# Patient Record
Sex: MALE | Race: WHITE | Employment: UNEMPLOYED | URBAN - METROPOLITAN AREA
[De-identification: names, ages, dates, MRNs, and addresses within clinical notes are randomized per-mention and may not be internally consistent; named-entity substitution may affect disease eponyms.]

---

## 2020-08-24 RX ORDER — CLINDAMYCIN HYDROCHLORIDE 150 MG/1
CAPSULE ORAL
COMMUNITY
Start: 2020-05-29 | End: 2020-08-26 | Stop reason: ALTCHOICE

## 2020-08-24 RX ORDER — BUPRENORPHINE AND NALOXONE 8; 2 MG/1; MG/1
FILM, SOLUBLE BUCCAL; SUBLINGUAL
COMMUNITY
Start: 2020-08-05

## 2020-08-26 ENCOUNTER — OFFICE VISIT (OUTPATIENT)
Dept: FAMILY MEDICINE CLINIC | Age: 60
End: 2020-08-26
Payer: COMMERCIAL

## 2020-08-26 VITALS
HEIGHT: 66 IN | TEMPERATURE: 97.9 F | DIASTOLIC BLOOD PRESSURE: 64 MMHG | RESPIRATION RATE: 20 BRPM | SYSTOLIC BLOOD PRESSURE: 100 MMHG | HEART RATE: 76 BPM | WEIGHT: 120.1 LBS | BODY MASS INDEX: 19.3 KG/M2 | OXYGEN SATURATION: 94 %

## 2020-08-26 LAB
BASOPHILS ABSOLUTE: 0.1 K/UL (ref 0–0.2)
BASOPHILS RELATIVE PERCENT: 0.8 %
EOSINOPHILS ABSOLUTE: 0.1 K/UL (ref 0–0.6)
EOSINOPHILS RELATIVE PERCENT: 1.4 %
HCT VFR BLD CALC: 45.8 % (ref 40.5–52.5)
HEMOGLOBIN: 15 G/DL (ref 13.5–17.5)
LYMPHOCYTES ABSOLUTE: 1.1 K/UL (ref 1–5.1)
LYMPHOCYTES RELATIVE PERCENT: 17.8 %
MCH RBC QN AUTO: 30.4 PG (ref 26–34)
MCHC RBC AUTO-ENTMCNC: 32.6 G/DL (ref 31–36)
MCV RBC AUTO: 93 FL (ref 80–100)
MONOCYTES ABSOLUTE: 0.5 K/UL (ref 0–1.3)
MONOCYTES RELATIVE PERCENT: 8.5 %
NEUTROPHILS ABSOLUTE: 4.4 K/UL (ref 1.7–7.7)
NEUTROPHILS RELATIVE PERCENT: 71.5 %
PDW BLD-RTO: 13.8 % (ref 12.4–15.4)
PLATELET # BLD: 242 K/UL (ref 135–450)
PMV BLD AUTO: 7.9 FL (ref 5–10.5)
RBC # BLD: 4.93 M/UL (ref 4.2–5.9)
WBC # BLD: 6.1 K/UL (ref 4–11)

## 2020-08-26 PROCEDURE — G8427 DOCREV CUR MEDS BY ELIG CLIN: HCPCS | Performed by: NURSE PRACTITIONER

## 2020-08-26 PROCEDURE — 99214 OFFICE O/P EST MOD 30 MIN: CPT | Performed by: NURSE PRACTITIONER

## 2020-08-26 PROCEDURE — 4004F PT TOBACCO SCREEN RCVD TLK: CPT | Performed by: NURSE PRACTITIONER

## 2020-08-26 PROCEDURE — 3017F COLORECTAL CA SCREEN DOC REV: CPT | Performed by: NURSE PRACTITIONER

## 2020-08-26 PROCEDURE — G8420 CALC BMI NORM PARAMETERS: HCPCS | Performed by: NURSE PRACTITIONER

## 2020-08-26 RX ORDER — ALBUTEROL SULFATE 90 UG/1
AEROSOL, METERED RESPIRATORY (INHALATION)
COMMUNITY
Start: 2020-02-29 | End: 2021-09-29

## 2020-08-26 ASSESSMENT — ENCOUNTER SYMPTOMS
ABDOMINAL DISTENTION: 0
ABDOMINAL PAIN: 0
SHORTNESS OF BREATH: 0
FACIAL SWELLING: 0
CHOKING: 1
TROUBLE SWALLOWING: 0
COUGH: 0
VOICE CHANGE: 0
COLOR CHANGE: 0
EYE DISCHARGE: 0
EYE REDNESS: 0
STRIDOR: 0
PHOTOPHOBIA: 0
EYE PAIN: 0
APNEA: 0
CHEST TIGHTNESS: 0
RECTAL PAIN: 0
BACK PAIN: 1
WHEEZING: 0

## 2020-08-26 ASSESSMENT — PATIENT HEALTH QUESTIONNAIRE - PHQ9
2. FEELING DOWN, DEPRESSED OR HOPELESS: 0
1. LITTLE INTEREST OR PLEASURE IN DOING THINGS: 0
SUM OF ALL RESPONSES TO PHQ QUESTIONS 1-9: 0
SUM OF ALL RESPONSES TO PHQ QUESTIONS 1-9: 0
SUM OF ALL RESPONSES TO PHQ9 QUESTIONS 1 & 2: 0

## 2020-08-26 NOTE — PATIENT INSTRUCTIONS
Patient Education        Abnormal Weight Loss: Care Instructions  Your Care Instructions     There are two types of weight loss--normal and abnormal. The normal kind happens when you are trying to lose weight by exercising more or eating less. The abnormal kind happens when you are not trying to lose weight. Many medical problems can cause abnormal weight loss. These include problems with your thyroid gland, long-term infections, mouth or throat problems that make it hard to eat, and digestive problems. They also include depression and cancer. Some medicines also may cause you to lose weight. You can work with your doctor to find the cause of your weight loss. You will probably need tests to do this. Follow-up care is a key part of your treatment and safety. Be sure to make and go to all appointments, and call your doctor if you are having problems. It's also a good idea to know your test results and keep a list of the medicines you take. How can you care for yourself at home? · Weigh yourself at the same time every day. It's best to do it first thing in the morning after you empty your bladder. Be sure to always wear the same amount of clothing. · Write down any changes in your weight and the possible causes. Discuss these with your doctor. · Your doctor may want you to change your diet. Do your best to follow his or her advice. · Ask your doctor if you should see a dietitian. This is a person who can help you plan meals that work best for your lifestyle. · Note any changes in bowel habits. These may include changes in how often you have a bowel movement. Other changes include the color and size of your stools and how solid they are. · If you are prescribed medicines, take them exactly as prescribed. Call your doctor if you think you are having a problem with your medicine. You will get more details on the specific medicines your doctor prescribes. When should you call for help?   Watch closely for changes in your health, and be sure to contact your doctor if:  · You do not get better as expected. · You continue to lose weight. Where can you learn more? Go to https://frooly.[employer]. org and sign in to your [employer] account. Enter A790 in the CISSO[employer] box to learn more about \"Abnormal Weight Loss: Care Instructions. \"     If you do not have an account, please click on the \"Sign Up Now\" link. Current as of: December 11, 2019               Content Version: 12.5  © 3903-1258 Healthwise, Incorporated. Care instructions adapted under license by [facility] ([facility]). If you have questions about a medical condition or this instruction, always ask your healthcare professional. [address] any warranty or liability for your use of this information.

## 2020-08-26 NOTE — PROGRESS NOTES
Picture of mass behind right knee (Bakers cyst) and demonstration of weight loss with muscle mass loss/cachexia    2020     Consuelo Sampson (:  1960) is a 62 y.o. male, whom appears older than his age, is here for evaluation of the following medical concerns:    Establish a provider: He has not seen a PCP for over 5 years: He has recently seen vascular speciality. Right Shoulder Pain: He reports having helped his sons with putting up a swimming pool which required shoveling and some heavy lifting. He knows of no injury. Audible joint movement crepitus present throughout shoulder, neck, jaw, and back. Of note patient has very little if any adipose tissue and visual muscle loss. Weight loss: The patient reports 60 pound weight loss over the last few years. 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 He additionally had all his teeth extracted months ago and has not gotten dentures as of yet. He reports he is scared of choking while eating. He reports very little appetite. Mass behind right knee: The patient states he has a mass behind right knee. I can visualize and palpate the mass without difficulty due to lack of adipose tissue and decreased muscle mass. Rent CT results reveal a Bakers Cyst behind the right knee. Lesion on liver identified on CT of abdomen: Follow up MRI ordered      HPI  Leiter's Disease    Review of Systems   Constitutional: Positive for activity change, appetite change and unexpected weight change. Negative for chills, diaphoresis and fever. HENT: Negative for congestion, drooling, ear discharge, ear pain, facial swelling, mouth sores, nosebleeds, sneezing, trouble swallowing and voice change. Has no teeth and no dentures   Eyes: Negative for photophobia, pain, discharge, redness and visual disturbance. Respiratory: Positive for choking.  Negative for apnea, cough, chest tightness, shortness of breath, wheezing and stridor. Cardiovascular: Negative for chest pain, palpitations and leg swelling. Gastrointestinal: Negative for abdominal distention, abdominal pain and rectal pain. Endocrine: Negative for polydipsia, polyphagia and polyuria. Genitourinary: Negative for difficulty urinating, flank pain and hematuria. Musculoskeletal: Positive for arthralgias, back pain and myalgias. Negative for gait problem. Skin: Negative for color change, rash and wound. Allergic/Immunologic: Negative for environmental allergies and immunocompromised state. Neurological: Positive for tremors. Negative for seizures, syncope, facial asymmetry, speech difficulty, weakness, light-headedness and numbness. Hematological: Does not bruise/bleed easily. Psychiatric/Behavioral: Positive for decreased concentration. Negative for agitation, confusion, hallucinations, self-injury and suicidal ideas. Prior to Visit Medications    Medication Sig Taking?  Authorizing Provider   albuterol sulfate  (90 Base) MCG/ACT inhaler INHALE 2 PUFFS EVERY 6 HOURS AS NEEDED Yes Historical Provider, MD   buprenorphine-naloxone (SUBOXONE) 8-2 MG FILM SL film PLACE 1 FILM AGAINST THE INSIDE OF EACH CHEEK BY BUCCAL ROUTE ONCE DAILY FOR A TOTAL OF 2 FILMS ALLOW TO DISSOLVE COMPLETELY  Historical Provider, MD        Social History     Tobacco Use    Smoking status: Current Every Day Smoker     Packs/day: 1.00     Years: 30.00     Pack years: 30.00     Types: Cigarettes    Smokeless tobacco: Never Used    Tobacco comment: counselled on cessation- 5/28/2015   Substance Use Topics    Alcohol use: No     Alcohol/week: 0.0 standard drinks        Vitals:    08/26/20 1309   BP: 100/64   Site: Right Upper Arm   Position: Sitting   Cuff Size: Medium Adult   Pulse: 76   Resp: 20   Temp: 97.9 °F (36.6 °C)   TempSrc: Temporal   SpO2: 94%   Weight: 120 lb 1.6 oz (54.5 kg)   Height: 5' 6\" (1.676 m)     Estimated body mass index is 19.38 kg/m² as calculated from the following:    Height as of this encounter: 5' 6\" (1.676 m). Weight as of this encounter: 120 lb 1.6 oz (54.5 kg). Physical Exam  Vitals signs and nursing note reviewed. Constitutional:       General: He is not in acute distress. Appearance: He is well-developed. He is not diaphoretic. Comments: Cachexia noted: Under weight   HENT:      Head: Normocephalic and atraumatic. Right Ear: Tympanic membrane, ear canal and external ear normal.      Left Ear: Tympanic membrane, ear canal and external ear normal.      Ears:      Comments: Audible to naked ear TMJ/joint crepitus      Nose: Nose normal.      Mouth/Throat:      Mouth: Mucous membranes are dry. Pharynx: Oropharynx is clear. Comments: Patient states dry mouth secondary to mask wearing in office  Eyes:      General:         Right eye: No discharge. Left eye: No discharge. Conjunctiva/sclera: Conjunctivae normal.      Pupils: Pupils are equal, round, and reactive to light. Neck:      Musculoskeletal: Normal range of motion. Vascular: No JVD. Trachea: No tracheal deviation. Cardiovascular:      Rate and Rhythm: Normal rate and regular rhythm. Heart sounds: No murmur. No friction rub. No gallop. Pulmonary:      Effort: Pulmonary effort is normal. No respiratory distress. Breath sounds: Normal breath sounds. No stridor. Chest:      Chest wall: No tenderness. Abdominal:      General: Abdomen is flat. Bowel sounds are normal. There is no distension. Palpations: There is no mass. Tenderness: There is no abdominal tenderness. There is no guarding or rebound. Hernia: No hernia is present. Genitourinary:     Penis: No tenderness. Musculoskeletal: Normal range of motion. General: No tenderness or deformity. Comments: Increased involuntary movements    Lymphadenopathy:      Cervical: No cervical adenopathy.    Skin:     General: Skin is warm and dry. Capillary Refill: Capillary refill takes 2 to 3 seconds. Findings: No erythema or rash. Neurological:      Mental Status: He is alert and oriented to person, place, and time. Cranial Nerves: No cranial nerve deficit. Sensory: No sensory deficit. Coordination: Coordination normal.   Psychiatric:         Mood and Affect: Mood normal.         Thought Content: Thought content normal.      Recent CT of the abdomen shows requirement for hepatic follow up to eval lesion. Will follow closely to ensure ordering physician refers patient. ASSESSMENT/PLAN:  1. Preventative health care    - Prealbumin  - CBC  - Comprehensive Metabolic Panel; Future  - Psa screening; Future  - TSH without Reflex; Future  - CBC Auto Differential; Future    2. Weight loss    - Prealbumin  - CBC  - Comprehensive Metabolic Panel; Future  - Psa screening; Future  - TSH without Reflex; Future  - CBC Auto Differential; Future    3. Loss of appetite    - Prealbumin  - Comprehensive Metabolic Panel; Future  - TSH without Reflex; Future    4. Cachexia [facility])    Extensive education given to patient on increasing his caloric intake and decreasing his activities (like taking down a pool) that burns excessive calories. Instructed to start ensure dietary supplements. Will await pre-albumin result. Spoke to patient and daughter about the need to get dentures and/or gave them extensive education on his need to increase eating. Spoke with daughter about the need for an MRI of the liver. No follow-ups on file. An electronic signature was used to authenticate this note.     --GABRIELE Shi - CNP on 8/26/2020 at 2:36 PM

## 2020-08-27 LAB
A/G RATIO: 2.1 (ref 1.1–2.2)
ALBUMIN SERPL-MCNC: 4.2 G/DL (ref 3.4–5)
ALP BLD-CCNC: 47 U/L (ref 40–129)
ALT SERPL-CCNC: 23 U/L (ref 10–40)
ANION GAP SERPL CALCULATED.3IONS-SCNC: 10 MMOL/L (ref 3–16)
AST SERPL-CCNC: 31 U/L (ref 15–37)
BILIRUB SERPL-MCNC: 0.7 MG/DL (ref 0–1)
BUN BLDV-MCNC: 17 MG/DL (ref 7–20)
C-REACTIVE PROTEIN: <0.3 MG/L (ref 0–5.1)
CALCIUM SERPL-MCNC: 9.2 MG/DL (ref 8.3–10.6)
CHLORIDE BLD-SCNC: 103 MMOL/L (ref 99–110)
CO2: 28 MMOL/L (ref 21–32)
CREAT SERPL-MCNC: 0.7 MG/DL (ref 0.9–1.3)
GFR AFRICAN AMERICAN: >60
GFR NON-AFRICAN AMERICAN: >60
GLOBULIN: 2 G/DL
GLUCOSE BLD-MCNC: 100 MG/DL (ref 70–99)
POTASSIUM SERPL-SCNC: 4.3 MMOL/L (ref 3.5–5.1)
PREALBUMIN: 18.7 MG/DL (ref 20–40)
PROSTATE SPECIFIC ANTIGEN: 0.21 NG/ML (ref 0–4)
SODIUM BLD-SCNC: 141 MMOL/L (ref 136–145)
TOTAL PROTEIN: 6.2 G/DL (ref 6.4–8.2)
TRANSFERRIN: 356 MG/DL (ref 200–360)
TSH SERPL DL<=0.05 MIU/L-ACNC: 1.4 UIU/ML (ref 0.27–4.2)
URIC ACID, SERUM: 3.9 MG/DL (ref 3.5–7.2)

## 2020-11-30 ENCOUNTER — TELEPHONE (OUTPATIENT)
Dept: FAMILY MEDICINE CLINIC | Age: 60
End: 2020-11-30

## 2020-12-17 ENCOUNTER — TELEPHONE (OUTPATIENT)
Dept: FAMILY MEDICINE CLINIC | Age: 60
End: 2020-12-17

## 2020-12-17 NOTE — TELEPHONE ENCOUNTER
----- Message from Jill Padron sent at 12/16/2020  5:03 PM EST -----  Subject: Referral Request    QUESTIONS   Reason for referral request? [facility] [place] [facility] [address] [facility]ener[facility]. Pt called about this on 11/30/2020   with no response. Pt called today and stated office was supposed to call. I didn't find any proof of that. Has the physician seen you for this condition before? No   Preferred Specialist (if applicable)? Do you already have an appointment scheduled? No  Additional Information for Provider? Pt would prefer anytime after noon   for a call back. Pt is a bit worried since nobody has contacted him about   this. This may be because the lack of referral requests. (I didn't see a   previous one in [place] or Epic.)  ---------------------------------------------------------------------------  --------------  CALL BACK INFO  What is the best way for the office to contact you? OK to leave message on   voicemail  Preferred Call Back Phone Number?  [phone]

## 2020-12-17 NOTE — TELEPHONE ENCOUNTER
----- Message from Missy Garcia sent at 12/16/2020  5:03 PM EST -----  Subject: Referral Request    QUESTIONS   Reason for referral request? [facility] [address] [facility]. Pt called about this on 11/30/2020   with no response. Pt called today and stated office was supposed to call. I didn't find any proof of that. Has the physician seen you for this condition before? No   Preferred Specialist (if applicable)? Do you already have an appointment scheduled? No  Additional Information for Provider? Pt would prefer anytime after noon   for a call back. Pt is a bit worried since nobody has contacted him about   this. This may be because the lack of referral requests. (I didn't see a   previous one in  or Epic.)  ---------------------------------------------------------------------------  --------------  CALL BACK INFO  What is the best way for the office to contact you? OK to leave message on   voicemail  Preferred Call Back Phone Number?  [phone]

## 2020-12-18 NOTE — TELEPHONE ENCOUNTER
Pt established care 8/26/2020 w/ Grecia Nix. Back pain was noted, but I do not see any previous medical history or diagnosis of DDD.  I have pended a referral.

## 2020-12-18 NOTE — TELEPHONE ENCOUNTER
He will need an appointment with Hazel French to discuss his DDD. I do not see any recent MRI of his spine and cotter will not see him without imaging. Per the new patient note from 8/2020 degenerative disc disease was not discussed. Please assist the patient in scheduling an appointment with Hazel French to discuss his concerns so that the appropriate referrals can be made.

## 2020-12-21 NOTE — TELEPHONE ENCOUNTER
He seen on ortho physician with this diagnosis back in 2016. I don't mind giving a referral but to which physician/practice.

## 2021-02-08 ENCOUNTER — TELEPHONE (OUTPATIENT)
Dept: FAMILY MEDICINE CLINIC | Age: 61
End: 2021-02-08

## 2021-02-08 DIAGNOSIS — M25.861 CYST OF RIGHT KNEE JOINT: Primary | ICD-10-CM

## 2021-02-08 NOTE — TELEPHONE ENCOUNTER
Pat Merino asked that [address] [place] [address] be faxed again. I faxed to [phone] and [phone]. I received fax confirmation for each.

## 2021-09-29 ENCOUNTER — OFFICE VISIT (OUTPATIENT)
Dept: FAMILY MEDICINE CLINIC | Age: 61
End: 2021-09-29
Payer: COMMERCIAL

## 2021-09-29 VITALS
HEART RATE: 80 BPM | DIASTOLIC BLOOD PRESSURE: 72 MMHG | OXYGEN SATURATION: 96 % | BODY MASS INDEX: 18.48 KG/M2 | WEIGHT: 115 LBS | SYSTOLIC BLOOD PRESSURE: 102 MMHG | TEMPERATURE: 96.6 F | HEIGHT: 66 IN | RESPIRATION RATE: 16 BRPM

## 2021-09-29 DIAGNOSIS — M17.11 PRIMARY OSTEOARTHRITIS OF RIGHT KNEE: ICD-10-CM

## 2021-09-29 DIAGNOSIS — Z12.11 COLON CANCER SCREENING: ICD-10-CM

## 2021-09-29 DIAGNOSIS — Z00.00 ROUTINE GENERAL MEDICAL EXAMINATION AT A HEALTH CARE FACILITY: Primary | ICD-10-CM

## 2021-09-29 DIAGNOSIS — Z12.5 PROSTATE CANCER SCREENING: ICD-10-CM

## 2021-09-29 DIAGNOSIS — M71.21 SYNOVIAL CYST OF RIGHT POPLITEAL SPACE: ICD-10-CM

## 2021-09-29 DIAGNOSIS — I73.9 PAD (PERIPHERAL ARTERY DISEASE) (HCC): ICD-10-CM

## 2021-09-29 DIAGNOSIS — Z87.891 FORMER SMOKER: ICD-10-CM

## 2021-09-29 DIAGNOSIS — G10 HUNTINGTON DISEASE (HCC): ICD-10-CM

## 2021-09-29 DIAGNOSIS — F11.10 OPIATE ABUSE, CONTINUOUS (HCC): ICD-10-CM

## 2021-09-29 LAB
HCT VFR BLD CALC: 38.4 % (ref 40.5–52.5)
HEMOGLOBIN: 12.6 G/DL (ref 13.5–17.5)
MCH RBC QN AUTO: 28.8 PG (ref 26–34)
MCHC RBC AUTO-ENTMCNC: 32.7 G/DL (ref 31–36)
MCV RBC AUTO: 88.2 FL (ref 80–100)
PDW BLD-RTO: 14.1 % (ref 12.4–15.4)
PLATELET # BLD: 426 K/UL (ref 135–450)
PMV BLD AUTO: 7.9 FL (ref 5–10.5)
RBC # BLD: 4.35 M/UL (ref 4.2–5.9)
WBC # BLD: 8.4 K/UL (ref 4–11)

## 2021-09-29 PROCEDURE — 99396 PREV VISIT EST AGE 40-64: CPT | Performed by: NURSE PRACTITIONER

## 2021-09-29 PROCEDURE — 36415 COLL VENOUS BLD VENIPUNCTURE: CPT | Performed by: NURSE PRACTITIONER

## 2021-09-29 ASSESSMENT — PATIENT HEALTH QUESTIONNAIRE - PHQ9
SUM OF ALL RESPONSES TO PHQ QUESTIONS 1-9: 0
2. FEELING DOWN, DEPRESSED OR HOPELESS: 0
1. LITTLE INTEREST OR PLEASURE IN DOING THINGS: 0
SUM OF ALL RESPONSES TO PHQ QUESTIONS 1-9: 0
SUM OF ALL RESPONSES TO PHQ9 QUESTIONS 1 & 2: 0
SUM OF ALL RESPONSES TO PHQ QUESTIONS 1-9: 0

## 2021-09-29 NOTE — PATIENT INSTRUCTIONS
Blood work today  make appointment with ortho for the knee pain  Complete cologuard test within a week of receiving it

## 2021-09-29 NOTE — PROGRESS NOTES
2021    Chief Complaint   Patient presents with    Annual Exam     Fasting      Quinton Mireles (:  1960) is a 62 y.o. male, here for a preventive medicine evaluation. He complains of chronic pain left knee, upper thigh feels tight  Has a baker's cyst and osteoarthritis of the right knee  Thinks the baker's cyst is causing his pain  [facility] Israel Dias)- formerly abusing opiates  PVD- sees Dr. Melody Parekh and is scheduled to have vascular study next week  He goes to [facility] HD        Patient Active Problem List   Diagnosis    Chronic back pain    Knee pain    Neck pain    Former smoker    Pain medication agreement signed    [place] [facility]    Iliac artery aneurysm ([address]    Primary osteoarthritis of right knee    PAD (peripheral artery disease) ([facility]    Synovial cyst of right popliteal space     Review of Systems   Constitutional: Negative for fatigue and fever. Respiratory: Negative for cough and shortness of breath. Cardiovascular: Negative for chest pain and leg swelling. Gastrointestinal: Negative for diarrhea, nausea and vomiting. Neurological: Negative for dizziness and headaches. Sven- movement disorder     Prior to Visit Medications    Medication Sig Taking?  Authorizing Provider   buprenorphine-naloxone (SUBOXONE) 8-2 MG FILM SL film PLACE 1 FILM AGAINST THE INSIDE OF EACH CHEEK BY BUCCAL ROUTE ONCE DAILY FOR A TOTAL OF 2 FILMS ALLOW TO DISSOLVE COMPLETELY  Patient not taking: Reported on 2021  Historical Provider, MD        Allergies   Allergen Reactions    Penicillins        Past Medical History:   Diagnosis Date    Chronic back pain     [place] disease ([facility] 2021    Knee pain     Primary osteoarthritis of right knee 10/13/2015       Past Surgical History:   Procedure Laterality Date    LEG SURGERY      flor put in    TONSILLECTOMY         Social History Socioeconomic History    Marital status: Unknown     Spouse name: Not on file    Number of children: Not on file    Years of education: Not on file    Highest education level: Not on file   Occupational History    Not on file   Tobacco Use    Smoking status: Former Smoker     Packs/day: 1.00     Years: 30.00     Pack years: 30.00     Types: Cigarettes     Start date: 5/28/2019    Smokeless tobacco: Never Used    Tobacco comment: counselled on cessation- 5/28/2015   Substance and Sexual Activity    Alcohol use: No     Alcohol/week: 0.0 standard drinks    Drug use: No    Sexual activity: Yes     Partners: Female, Male   Other Topics Concern    Not on file   Social History Narrative    Not on file     Social Determinants of Health     Financial Resource Strain:     Difficulty of Paying Living Expenses:    Food Insecurity:     Worried About Running Out of Food in the Last Year:     [address] in the Last Year:    Transportation Needs:     Lack of Transportation (Medical):      Lack of Transportation (Non-Medical):    Physical Activity:     Days of Exercise per Week:     Minutes of Exercise per Session:    Stress:     Feeling of Stress :    Social Connections:     Frequency of Communication with Friends and Family:     Frequency of Social Gatherings with Friends and Family:     Attends [employer] Services:     Active Member of Clubs or Organizations:     Attends Club or Organization Meetings:     Marital Status:    Intimate Partner Violence:     Fear of Current or Ex-Partner:     Emotionally Abused:     Physically Abused:     Sexually Abused:         Family History   Problem Relation Age of Onset    Arthritis Mother     Heart Disease Father     High Blood Pressure Father     High Cholesterol Father        ADVANCE DIRECTIVE: N, <no information>    Vitals:    09/29/21 1456   BP: 102/72   Site: Left Upper Arm   Position: Sitting   Pulse: 80   Resp: 16   Temp: 96.6 °F (35.9 °C) TempSrc: Temporal   SpO2: 96%   Weight: 115 lb (52.2 kg)   Height: 5' 6\" (1.676 m)     Estimated body mass index is 18.56 kg/m² as calculated from the following:    Height as of this encounter: 5' 6\" (1.676 m). Weight as of this encounter: 115 lb (52.2 kg). Physical Exam  Vitals and nursing note reviewed. Constitutional:       General: He is not in acute distress. Appearance: Normal appearance. He is well-developed. He is not ill-appearing, toxic-appearing or diaphoretic. HENT:      Head: Normocephalic and atraumatic. Right Ear: External ear normal.      Left Ear: External ear normal.   Eyes:      General: No scleral icterus. Right eye: No discharge. Left eye: No discharge. Extraocular Movements: Extraocular movements intact. Conjunctiva/sclera: Conjunctivae normal.      Pupils: Pupils are equal, round, and reactive to light. Cardiovascular:      Rate and Rhythm: Normal rate and regular rhythm. Heart sounds: Normal heart sounds. No murmur heard. No friction rub. No gallop. Pulmonary:      Effort: Pulmonary effort is normal. No respiratory distress. Breath sounds: Normal breath sounds. No stridor. No wheezing, rhonchi or rales. Skin:     General: Skin is warm and dry. Neurological:      General: No focal deficit present. Mental Status: He is alert and oriented to person, place, and time. Mental status is at baseline. Cranial Nerves: No cranial nerve deficit. Comments: Involuntary movements   Psychiatric:         Mood and Affect: Mood normal.         Thought Content: Thought content normal.         Judgment: Judgment normal.         No flowsheet data found.     Lab Results   Component Value Date    CHOL 128 09/29/2021    CHOL 185 09/22/2011    TRIG 53 09/29/2021    TRIG 113 09/22/2011    HDL 53 09/29/2021    HDL 48 09/22/2011    LDLCALC 64 09/29/2021    LDLCALC 114 09/22/2011    GLUCOSE 84 09/29/2021       The ASCVD Risk score (Ousmane Sims Abisai Jha, et al., 2013) failed to calculate for the following reasons: The valid total cholesterol range is 130 to 320 mg/dL      There is no immunization history on file for this patient. Health Maintenance   Topic Date Due    Hepatitis C screen  Never done    COVID-19 Vaccine (1) Never done    HIV screen  Never done    DTaP/Tdap/Td vaccine (1 - Tdap) Never done    Shingles Vaccine (1 of 2) Never done    Colon Cancer Screen FIT/FOBT  09/13/2013    Flu vaccine (1) 09/29/2022 (Originally 9/1/2021)    Lipid screen  09/29/2026    Hepatitis A vaccine  Aged Out    Hepatitis B vaccine  Aged Out    Hib vaccine  Aged Out    Meningococcal (ACWY) vaccine  Aged Out    Pneumococcal 0-64 years Vaccine  Aged Out          ASSESSMENT/PLAN:  1. Routine general medical examination at a health care facility  -     CBC  -     Comprehensive Metabolic Panel  -     Lipid Panel  -     PSA  2. Synovial cyst of right popliteal space  -     [address] [place] [address] and [facility]  3. [place] disease ([facility]  4. Primary osteoarthritis of right knee  -     [address] [place] [address] and [facility]  5. Opiate abuse, continuous ([facility] [address] in remission  6. PAD (peripheral artery disease) ([facility]  7. Former smoker  8. Prostate cancer screening  -     PSA  9. Colon cancer screening  -     Cologuard (For External Results Only); Future      · Blood work today  · Referral to ortho for knee pain  · On Suboxone for opiate abuse  · Has appointment for vascular study next week  · Missed appointment with neuro and plans to schedule follow up  · Agreeable to Cologuard which was ordered    An electronic signature was used to authenticate this note.     --GABRIELE Lino - CNP on 9/30/2021 at 8:42 AM

## 2021-09-30 PROBLEM — I73.9 PAD (PERIPHERAL ARTERY DISEASE) (HCC): Status: ACTIVE | Noted: 2021-09-30

## 2021-09-30 PROBLEM — M71.21 SYNOVIAL CYST OF RIGHT POPLITEAL SPACE: Status: ACTIVE | Noted: 2021-09-30

## 2021-09-30 PROBLEM — I72.3 ILIAC ARTERY ANEURYSM (HCC): Status: ACTIVE | Noted: 2021-09-30

## 2021-09-30 LAB
A/G RATIO: 1.5 (ref 1.1–2.2)
ALBUMIN SERPL-MCNC: 3.9 G/DL (ref 3.4–5)
ALP BLD-CCNC: 72 U/L (ref 40–129)
ALT SERPL-CCNC: 13 U/L (ref 10–40)
ANION GAP SERPL CALCULATED.3IONS-SCNC: 13 MMOL/L (ref 3–16)
AST SERPL-CCNC: 18 U/L (ref 15–37)
BILIRUB SERPL-MCNC: 0.8 MG/DL (ref 0–1)
BUN BLDV-MCNC: 12 MG/DL (ref 7–20)
CALCIUM SERPL-MCNC: 9 MG/DL (ref 8.3–10.6)
CHLORIDE BLD-SCNC: 100 MMOL/L (ref 99–110)
CHOLESTEROL, TOTAL: 128 MG/DL (ref 0–199)
CO2: 26 MMOL/L (ref 21–32)
CREAT SERPL-MCNC: 0.6 MG/DL (ref 0.8–1.3)
GFR AFRICAN AMERICAN: >60
GFR NON-AFRICAN AMERICAN: >60
GLOBULIN: 2.6 G/DL
GLUCOSE BLD-MCNC: 84 MG/DL (ref 70–99)
HDLC SERPL-MCNC: 53 MG/DL (ref 40–60)
LDL CHOLESTEROL CALCULATED: 64 MG/DL
POTASSIUM SERPL-SCNC: 4.7 MMOL/L (ref 3.5–5.1)
SODIUM BLD-SCNC: 139 MMOL/L (ref 136–145)
TOTAL PROTEIN: 6.5 G/DL (ref 6.4–8.2)
TRIGL SERPL-MCNC: 53 MG/DL (ref 0–150)
VLDLC SERPL CALC-MCNC: 11 MG/DL

## 2021-09-30 ASSESSMENT — ENCOUNTER SYMPTOMS
VOMITING: 0
DIARRHEA: 0
SHORTNESS OF BREATH: 0
COUGH: 0
NAUSEA: 0

## 2021-10-04 DIAGNOSIS — D64.9 ANEMIA, UNSPECIFIED TYPE: Primary | ICD-10-CM

## 2021-10-04 DIAGNOSIS — Z12.5 PROSTATE CANCER SCREENING: ICD-10-CM

## 2022-08-23 ENCOUNTER — OFFICE VISIT (OUTPATIENT)
Dept: FAMILY MEDICINE CLINIC | Age: 62
End: 2022-08-23
Payer: COMMERCIAL

## 2022-08-23 ENCOUNTER — HOSPITAL ENCOUNTER (OUTPATIENT)
Dept: GENERAL RADIOLOGY | Age: 62
Discharge: HOME | End: 2022-08-23
Payer: COMMERCIAL

## 2022-08-23 VITALS
BODY MASS INDEX: 19.37 KG/M2 | DIASTOLIC BLOOD PRESSURE: 70 MMHG | RESPIRATION RATE: 16 BRPM | SYSTOLIC BLOOD PRESSURE: 108 MMHG | WEIGHT: 120 LBS | TEMPERATURE: 97.1 F | HEART RATE: 62 BPM | OXYGEN SATURATION: 99 %

## 2022-08-23 DIAGNOSIS — M54.2 CHRONIC NECK PAIN: ICD-10-CM

## 2022-08-23 DIAGNOSIS — G89.29 CHRONIC PAIN OF RIGHT KNEE: Primary | ICD-10-CM

## 2022-08-23 DIAGNOSIS — Z87.891 FORMER SMOKER: ICD-10-CM

## 2022-08-23 DIAGNOSIS — G89.29 CHRONIC NECK PAIN: ICD-10-CM

## 2022-08-23 DIAGNOSIS — M25.561 CHRONIC PAIN OF RIGHT KNEE: Primary | ICD-10-CM

## 2022-08-23 DIAGNOSIS — M54.50 CHRONIC BILATERAL LOW BACK PAIN WITHOUT SCIATICA: ICD-10-CM

## 2022-08-23 DIAGNOSIS — G10 HUNTINGTON DISEASE (HCC): ICD-10-CM

## 2022-08-23 DIAGNOSIS — Z12.5 PROSTATE CANCER SCREENING: ICD-10-CM

## 2022-08-23 DIAGNOSIS — G89.29 CHRONIC BILATERAL LOW BACK PAIN WITHOUT SCIATICA: ICD-10-CM

## 2022-08-23 DIAGNOSIS — M71.21 SYNOVIAL CYST OF RIGHT POPLITEAL SPACE: ICD-10-CM

## 2022-08-23 LAB
ANION GAP SERPL CALCULATED.3IONS-SCNC: 9 MMOL/L (ref 3–16)
BUN BLDV-MCNC: 12 MG/DL (ref 7–20)
CALCIUM SERPL-MCNC: 9.1 MG/DL (ref 8.3–10.6)
CHLORIDE BLD-SCNC: 105 MMOL/L (ref 99–110)
CO2: 29 MMOL/L (ref 21–32)
CREAT SERPL-MCNC: 0.8 MG/DL (ref 0.8–1.3)
GFR AFRICAN AMERICAN: >60
GFR NON-AFRICAN AMERICAN: >60
GLUCOSE BLD-MCNC: 79 MG/DL (ref 70–99)
HCT VFR BLD CALC: 40.9 % (ref 40.5–52.5)
HEMOGLOBIN: 13.6 G/DL (ref 13.5–17.5)
MCH RBC QN AUTO: 29.1 PG (ref 26–34)
MCHC RBC AUTO-ENTMCNC: 33.2 G/DL (ref 31–36)
MCV RBC AUTO: 87.8 FL (ref 80–100)
PDW BLD-RTO: 14.7 % (ref 12.4–15.4)
PLATELET # BLD: 241 K/UL (ref 135–450)
PMV BLD AUTO: 7.5 FL (ref 5–10.5)
POTASSIUM SERPL-SCNC: 4.5 MMOL/L (ref 3.5–5.1)
PROSTATE SPECIFIC ANTIGEN: 0.3 NG/ML (ref 0–4)
RBC # BLD: 4.66 M/UL (ref 4.2–5.9)
SODIUM BLD-SCNC: 143 MMOL/L (ref 136–145)
WBC # BLD: 4.5 K/UL (ref 4–11)

## 2022-08-23 PROCEDURE — 99214 OFFICE O/P EST MOD 30 MIN: CPT | Performed by: NURSE PRACTITIONER

## 2022-08-23 PROCEDURE — G8420 CALC BMI NORM PARAMETERS: HCPCS | Performed by: NURSE PRACTITIONER

## 2022-08-23 PROCEDURE — 1036F TOBACCO NON-USER: CPT | Performed by: NURSE PRACTITIONER

## 2022-08-23 PROCEDURE — 72040 X-RAY EXAM NECK SPINE 2-3 VW: CPT

## 2022-08-23 PROCEDURE — G8427 DOCREV CUR MEDS BY ELIG CLIN: HCPCS | Performed by: NURSE PRACTITIONER

## 2022-08-23 PROCEDURE — 3017F COLORECTAL CA SCREEN DOC REV: CPT | Performed by: NURSE PRACTITIONER

## 2022-08-23 RX ORDER — CLOPIDOGREL BISULFATE 75 MG/1
75 TABLET ORAL DAILY
COMMUNITY
Start: 2022-03-02

## 2022-08-23 RX ORDER — ATORVASTATIN CALCIUM 80 MG/1
80 TABLET, FILM COATED ORAL DAILY
COMMUNITY
Start: 2022-03-02

## 2022-08-23 RX ORDER — FLUTICASONE PROPIONATE 50 MCG
SPRAY, SUSPENSION (ML) NASAL
COMMUNITY
Start: 2021-09-08 | End: 2022-08-24 | Stop reason: SDUPTHER

## 2022-08-23 RX ORDER — ASPIRIN 81 MG/1
TABLET, COATED ORAL
COMMUNITY
Start: 2022-05-31

## 2022-08-23 ASSESSMENT — PATIENT HEALTH QUESTIONNAIRE - PHQ9
DEPRESSION UNABLE TO ASSESS: FUNCTIONAL CAPACITY MOTIVATION LIMITS ACCURACY
SUM OF ALL RESPONSES TO PHQ QUESTIONS 1-9: 0
1. LITTLE INTEREST OR PLEASURE IN DOING THINGS: 0
SUM OF ALL RESPONSES TO PHQ QUESTIONS 1-9: 0
SUM OF ALL RESPONSES TO PHQ QUESTIONS 1-9: 0
SUM OF ALL RESPONSES TO PHQ9 QUESTIONS 1 & 2: 0
2. FEELING DOWN, DEPRESSED OR HOPELESS: 0
SUM OF ALL RESPONSES TO PHQ QUESTIONS 1-9: 0

## 2022-08-23 ASSESSMENT — ANXIETY QUESTIONNAIRES
7. FEELING AFRAID AS IF SOMETHING AWFUL MIGHT HAPPEN: 0
2. NOT BEING ABLE TO STOP OR CONTROL WORRYING: 0
GAD7 TOTAL SCORE: 0
IF YOU CHECKED OFF ANY PROBLEMS ON THIS QUESTIONNAIRE, HOW DIFFICULT HAVE THESE PROBLEMS MADE IT FOR YOU TO DO YOUR WORK, TAKE CARE OF THINGS AT HOME, OR GET ALONG WITH OTHER PEOPLE: NOT DIFFICULT AT ALL
5. BEING SO RESTLESS THAT IT IS HARD TO SIT STILL: 0
3. WORRYING TOO MUCH ABOUT DIFFERENT THINGS: 0
6. BECOMING EASILY ANNOYED OR IRRITABLE: 0
1. FEELING NERVOUS, ANXIOUS, OR ON EDGE: 0
4. TROUBLE RELAXING: 0

## 2022-08-23 ASSESSMENT — ENCOUNTER SYMPTOMS
COUGH: 0
BACK PAIN: 1
NAUSEA: 0
VOMITING: 0
SHORTNESS OF BREATH: 0
DIARRHEA: 0

## 2022-08-23 NOTE — PROGRESS NOTES
2022     Chief Complaint   Patient presents with    Other     Bakers cyst. Thinks he may need some blood work        Caldwell Merlin (:  1960) is a 65 y.o. male, here for evaluation of the following medical concerns:    HPI    Right knee pain: pain and stifnessin the morning, on going for the past several years. Worsening. Was told he he bakers cysts in this knee. He has been trying stretching and his symptoms have worsened. Neck pain: stifness, popping and grinding in the neck on going for the last year. He has chronic low back pain and known degenerative disc disease in the lumbar spine. His pain is worsening. Non radicular. Professional psychiatric services- on methadone    Poor short term memory, has help if needed from wife and son. He is independent in his ADLs/IADLS    Weight stable 120 lbs    Review of Systems   Constitutional:  Negative for fatigue and fever. Respiratory:  Negative for cough and shortness of breath. Cardiovascular:  Negative for chest pain and leg swelling. Gastrointestinal:  Negative for diarrhea, nausea and vomiting. Musculoskeletal:  Positive for arthralgias, back pain and neck pain. Negative for gait problem, joint swelling, myalgias and neck stiffness. Neurological:  Negative for dizziness and headaches. [place]- movement disorder     Prior to Visit Medications    Medication Sig Taking?  Authorizing Provider   metoprolol tartrate (LOPRESSOR) 25 MG tablet Take 25 mg by mouth 2 times daily Yes Historical Provider, MD   clopidogrel (PLAVIX) 75 MG tablet Take 75 mg by mouth daily Yes Historical Provider, MD   atorvastatin (LIPITOR) 80 MG tablet Take 80 mg by mouth daily Yes Historical Provider, MD   fluticasone (FLONASE) 50 MCG/ACT nasal spray by Nasal route Yes Historical Provider, MD   buprenorphine-naloxone (SUBOXONE) 8-2 MG FILM SL film PLACE 1 FILM AGAINST THE INSIDE OF EACH CHEEK BY BUCCAL ROUTE ONCE DAILY FOR A TOTAL OF 2 FILMS ALLOW TO distress. Breath sounds: Normal breath sounds. No stridor. No wheezing, rhonchi or rales. Musculoskeletal:         General: Normal range of motion. Cervical back: Normal range of motion and neck supple. No rigidity or tenderness. Lymphadenopathy:      Cervical: No cervical adenopathy. Skin:     General: Skin is warm and dry. Neurological:      General: No focal deficit present. Mental Status: He is alert and oriented to person, place, and time. Mental status is at baseline. Cranial Nerves: No cranial nerve deficit. Comments: Involuntary movements   Psychiatric:         Mood and Affect: Mood normal.         Behavior: Behavior normal.         Thought Content: Thought content normal.         Judgment: Judgment normal.       ASSESSMENT/PLAN:  1. Chronic pain of right knee  - [address] [place] [address] and Sports Medicine    2. Synovial cyst of right popliteal space  - [address] [place] [address] [facility]    3. Chronic bilateral low back pain without sciatica  - [facility] - Spine Surgery  - CBC; Future  - Basic Metabolic Panel; Future    4. 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 Prostate cancer screening  - PSA Screening; Future    7. Former smoker    Referral to ortho for knee, back and neck pain  Cervical XR today  Labs as ordered    Return if symptoms worsen or fail to improve. An electronic signature was used to authenticate this note.     --GABRIELE Gtz - CNP on 8/23/2022 at 12:58 PM

## 2022-08-24 RX ORDER — DOCUSATE SODIUM 100 MG/1
100 CAPSULE, LIQUID FILLED ORAL 2 TIMES DAILY
Qty: 60 CAPSULE | Refills: 0 | Status: SHIPPED | OUTPATIENT
Start: 2022-08-24 | End: 2022-09-23

## 2022-08-24 RX ORDER — FLUTICASONE PROPIONATE 50 MCG
1 SPRAY, SUSPENSION (ML) NASAL DAILY
Qty: 16 G | Refills: 1 | Status: SHIPPED | OUTPATIENT
Start: 2022-08-24

## 2022-08-24 NOTE — TELEPHONE ENCOUNTER
Patient is requesting refills for flonase. Also asking for a stool softner. The medication is causing him to be constipated.    LOV 8/23/2022  Future Appointments   Date Time Provider Halle Narvaez   8/29/2022  1:15 PM Rj Campuzano PA-C [facility]

## 2022-08-24 NOTE — TELEPHONE ENCOUNTER
Flonase refilled. I sent in a prescription for colace which is a stool softener he can take twice daily as needed.

## 2022-08-29 ENCOUNTER — OFFICE VISIT (OUTPATIENT)
Dept: ORTHOPEDIC SURGERY | Age: 62
End: 2022-08-29
Payer: COMMERCIAL

## 2022-08-29 VITALS — WEIGHT: 120 LBS | HEIGHT: 66 IN | BODY MASS INDEX: 19.29 KG/M2

## 2022-08-29 DIAGNOSIS — M54.12 CERVICAL RADICULOPATHY: Primary | ICD-10-CM

## 2022-08-29 PROCEDURE — 1036F TOBACCO NON-USER: CPT | Performed by: PHYSICIAN ASSISTANT

## 2022-08-29 PROCEDURE — G8427 DOCREV CUR MEDS BY ELIG CLIN: HCPCS | Performed by: PHYSICIAN ASSISTANT

## 2022-08-29 PROCEDURE — 3017F COLORECTAL CA SCREEN DOC REV: CPT | Performed by: PHYSICIAN ASSISTANT

## 2022-08-29 PROCEDURE — 99204 OFFICE O/P NEW MOD 45 MIN: CPT | Performed by: PHYSICIAN ASSISTANT

## 2022-08-29 PROCEDURE — G8420 CALC BMI NORM PARAMETERS: HCPCS | Performed by: PHYSICIAN ASSISTANT

## 2022-08-29 NOTE — PROGRESS NOTES
New Patient: CERVICAL SPINE    Referring Provider:  Henrry Gann    CHIEF COMPLAINT:    Chief Complaint   Patient presents with    Neck Pain     cervical       HISTORY OF PRESENT ILLNESS:   Mr. Maicol Cisneros is a pleasant 65 y.o. old male referred by his primary care provider [address], CNP, who has a history of Concho's disease, here for consultation regarding his neck and bilateral arm pain. He states the pain began insidiously several years ago and has gotten worse recently. He states that the pain in his neck is 10/10 with radiation of pain into both shoulders. He does describe intermittent paresthesias into both upper extremities and especially his hands. He has occasional radicular symptoms into both upper extremities that he ranges from 7-10/10. Denies any progressive weakness to either upper extremities and does have difficulty with fine motor movements secondary to Concho's disease. Patient denies any lower extremity progressive weakness, paresthesias, or pain. He does describe pain within his right knee on a regular basis. He does not ambulate with an assistive device. Pain at times interferes with his sleep. He denies any bowel or bladder dysfunction or  saddle anesthesia.    Pain Assessment  Location of Pain: Neck  Severity of Pain: 10  Quality of Pain: Other (Comment)  Duration of Pain: Other (Comment)  Frequency of Pain: Other (Comment)]  Current/Past Treatment:   Physical Therapy: Home exercise program  Chiropractic: None  Injection: None  Medications: None    Past Medical History:   Past Medical History:   Diagnosis Date    Chronic back pain     Concho disease ([facility] [address] 9/29/2021    Knee pain     Primary osteoarthritis of right knee 10/13/2015        Past Surgical History:     Past Surgical History:   Procedure Laterality Date    LEG SURGERY      flor put in    TONSILLECTOMY         Current Medications:     Current Outpatient Medications:     fluticasone (FLONASE) 50 MCG/ACT nasal spray, 1 spray by Nasal route daily, Disp: 16 g, Rfl: 1    docusate sodium (COLACE) 100 MG capsule, Take 1 capsule by mouth 2 times daily, Disp: 60 capsule, Rfl: 0    metoprolol tartrate (LOPRESSOR) 25 MG tablet, Take 25 mg by mouth 2 times daily, Disp: , Rfl:     clopidogrel (PLAVIX) 75 MG tablet, Take 75 mg by mouth daily, Disp: , Rfl:     atorvastatin (LIPITOR) 80 MG tablet, Take 80 mg by mouth daily, Disp: , Rfl:     ASPIRIN LOW DOSE 81 MG EC tablet, TAKE ONE TABLET BY MOUTH EVERY DAY, Disp: , Rfl:     buprenorphine-naloxone (SUBOXONE) 8-2 MG FILM SL film, PLACE 1 FILM AGAINST THE INSIDE OF EACH CHEEK BY BUCCAL ROUTE ONCE DAILY FOR A TOTAL OF 2 FILMS ALLOW TO DISSOLVE COMPLETELY, Disp: , Rfl:     Allergies:  Penicillins    Social History:    reports that he has quit smoking. His smoking use included cigarettes. He started smoking about 3 years ago. He has a 30.00 pack-year smoking history. He has never used smokeless tobacco. He reports that he does not drink alcohol and does not use drugs. Family History:   Family History   Problem Relation Age of Onset    Arthritis Mother     Heart Disease Father     High Blood Pressure Father     High Cholesterol Father        REVIEW OF SYSTEMS: Full ROS noted & scanned   CONSTITUTIONAL: Denies unexplained weight loss, fevers, chills or fatigue  NEUROLOGICAL: Denies unsteady gait or progressive weakness  MUSCULOSKELETAL: Denies joint swelling or redness  PSYCHOLOGICAL: Denies anxiety, depression   SKIN: Denies skin changes, delayed healing, rash, itching   HEMATOLOGIC: Denies easy bleeding or bruising  ENDOCRINE: Denies excessive thirst, urination, heat/cold  RESPIRATORY: Denies current dyspnea, cough  GI: Denies nausea, vomiting, diarrhea   : Denies bowel or bladder issues       PHYSICAL EXAM:    Vitals: Height 5' 5.98\" (1.676 m), weight 120 lb (54.4 kg).     GENERAL EXAM:  General Apparence: Patient is adequately groomed with no evidence of malnutrition. Orientation: The patient is oriented to time, place and person. Mood & Affect:The patient's mood and affect are appropriate   Vascular: Examination reveals no swelling tenderness in upper or lower extremities. Good capillary refill  Lymphatic: The lymphatic examination bilaterally reveals all areas to be without enlargement or induration  Sensation: Sensation is intact without deficit  Coordination/Balance: Good coordination. CERVICAL EXAMINATION:  Inspection: Local inspection shows no step-off or bruising. Cervical alignment is normal.     Palpation: No evidence of tenderness at the midline, and trapezius. Paraspinal tenderness is present. There is no step-off or paraspinal spasm. Range of Motion: Cervical flexion, extension, and rotation are mildly reduced with pain. Strength: 5/5 bilateral upper extremities   Special Tests:     Spurling's negative & Son's negative bilaterally. Cubital and Carpal tunnel Tinel's negative bilaterally. Skin:There are no rashes, ulcerations or lesions in right & left upper extremities. Reflexes: Bilaterally triceps, biceps and brachioradialis are 2+. Clonus absent bilaterally at the feet. Gait & station: normal, patient ambulates without assistance       Additional Examinations:       RIGHT UPPER EXTREMITY:  Inspection/examination of the right upper extremity does not show any tenderness, deformity or injury. Range of motion is unremarkable. There is no gross instability. There are no rashes, ulcerations or lesions. Strength and tone are normal.  LEFT UPPER EXTREMITY: Inspection/examination of the left upper extremity does not show any tenderness, deformity or injury. Range of motion is unremarkable. There is no gross instability. There are no rashes, ulcerations or lesions.  Strength and tone are normal.    Diagnostic Testing:  X-rays: X-rays of the cervical spine that were obtained on 8/23/2022 were independently reviewed with the patient which shows advanced degenerative disc disease with associated sclerosis and osseous overgrowth noted from C3-C7. There is multilevel facet arthropathy noted. Impression:   Moderate to severe cervical DDD  Facet arthropathy cervical spine  Cervical radiculitis      Plan:      We discussed the diagnosis and treatment options including observation, physical therapy, epidural injections or additional imaging. He wishes to proceed with EMG of both upper extremities to evaluate cervical radiculopathy and an MRI of the cervical spine. Follow Up: After the MRI and EMG to review the results and to discuss treatment options. Old records were reviewed.     Laury Perez PA-C  Board certified by the [facility] [address] [facility]

## 2022-09-16 ENCOUNTER — HOSPITAL ENCOUNTER (OUTPATIENT)
Dept: MRI IMAGING | Age: 62
Discharge: HOME | End: 2022-09-16
Payer: COMMERCIAL

## 2022-09-16 DIAGNOSIS — M54.12 CERVICAL RADICULOPATHY: ICD-10-CM

## 2022-09-16 PROCEDURE — 72141 MRI NECK SPINE W/O DYE: CPT

## 2022-09-20 ENCOUNTER — TELEPHONE (OUTPATIENT)
Dept: ORTHOPEDIC SURGERY | Age: 62
End: 2022-09-20

## 2022-09-20 NOTE — TELEPHONE ENCOUNTER
Medical Facility Question     Facility Name: [facility]  Contact Name: Jensen Lopez Number: [phone]  Request or Information: They have the MRI results.

## 2022-09-20 NOTE — TELEPHONE ENCOUNTER
Left him a message I was cancelling his appt with Vanessa Elias and needed to schedule him in with Dr. Queenie Murdock.

## 2022-10-06 ENCOUNTER — OFFICE VISIT (OUTPATIENT)
Dept: ORTHOPEDIC SURGERY | Age: 62
End: 2022-10-06
Payer: COMMERCIAL

## 2022-10-06 VITALS — BODY MASS INDEX: 18.49 KG/M2 | WEIGHT: 122 LBS | HEIGHT: 68 IN

## 2022-10-06 DIAGNOSIS — M54.12 CERVICAL RADICULOPATHY: Primary | ICD-10-CM

## 2022-10-06 PROCEDURE — G8420 CALC BMI NORM PARAMETERS: HCPCS | Performed by: ORTHOPAEDIC SURGERY

## 2022-10-06 PROCEDURE — 99213 OFFICE O/P EST LOW 20 MIN: CPT | Performed by: ORTHOPAEDIC SURGERY

## 2022-10-06 PROCEDURE — 3017F COLORECTAL CA SCREEN DOC REV: CPT | Performed by: ORTHOPAEDIC SURGERY

## 2022-10-06 PROCEDURE — G8484 FLU IMMUNIZE NO ADMIN: HCPCS | Performed by: ORTHOPAEDIC SURGERY

## 2022-10-06 PROCEDURE — G8427 DOCREV CUR MEDS BY ELIG CLIN: HCPCS | Performed by: ORTHOPAEDIC SURGERY

## 2022-10-06 PROCEDURE — 1036F TOBACCO NON-USER: CPT | Performed by: ORTHOPAEDIC SURGERY

## 2022-10-06 NOTE — PROGRESS NOTES
New Patient: CERVICAL SPINE    Referring Provider:  No ref. provider found    CHIEF COMPLAINT:    Neck pain      HISTORY OF PRESENT ILLNESS:   Mr. Kristan Pitts is a pleasant 65 y.o. old male referred by Zeke Sargent PA-C who has a history of Dexter's disease, here for consultation regarding his neck and bilateral arm pain. He states the pain began insidiously several years ago and has gotten worse recently. He states that the pain in his neck is 10/10 with radiation of pain into both shoulders. He does describe intermittent paresthesias into both upper extremities and especially his hands. He has occasional radicular symptoms into both upper extremities that he ranges from 7-10/10. Denies any progressive weakness to either upper extremities and does have difficulty with fine motor movements secondary to Dexter's disease. Patient denies any lower extremity progressive weakness, paresthesias, or pain. He does describe pain within his right knee on a regular basis. He does not ambulate with an assistive device. Pain at times interferes with his sleep.   He denies any bowel or bladder dysfunction or  saddle anesthesia.    ]  Current/Past Treatment:   Physical Therapy: Home exercise program  Chiropractic: None  Injection: None  Medications: None    Past Medical History:   Past Medical History:   Diagnosis Date    Chronic back pain     Dexter disease ([facility] [address] 9/29/2021    Knee pain     Primary osteoarthritis of right knee 10/13/2015        Past Surgical History:     Past Surgical History:   Procedure Laterality Date    LEG SURGERY      flor put in    TONSILLECTOMY         Current Medications:     Current Outpatient Medications:     fluticasone (FLONASE) 50 MCG/ACT nasal spray, 1 spray by Nasal route daily, Disp: 16 g, Rfl: 1    metoprolol tartrate (LOPRESSOR) 25 MG tablet, Take 25 mg by mouth 2 times daily, Disp: , Rfl:     clopidogrel (PLAVIX) 75 MG tablet, Take 75 mg by mouth daily, Disp: , Rfl: atorvastatin (LIPITOR) 80 MG tablet, Take 80 mg by mouth daily, Disp: , Rfl:     ASPIRIN LOW DOSE 81 MG EC tablet, TAKE ONE TABLET BY MOUTH EVERY DAY, Disp: , Rfl:     buprenorphine-naloxone (SUBOXONE) 8-2 MG FILM SL film, PLACE 1 FILM AGAINST THE INSIDE OF EACH CHEEK BY BUCCAL ROUTE ONCE DAILY FOR A TOTAL OF 2 FILMS ALLOW TO DISSOLVE COMPLETELY, Disp: , Rfl:     Allergies:  Penicillins    Social History:    reports that he has quit smoking. His smoking use included cigarettes. He started smoking about 3 years ago. He has a 30.00 pack-year smoking history. He has never used smokeless tobacco. He reports that he does not drink alcohol and does not use drugs. Family History:   Family History   Problem Relation Age of Onset    Arthritis Mother     Heart Disease Father     High Blood Pressure Father     High Cholesterol Father        REVIEW OF SYSTEMS: Full ROS noted & scanned   CONSTITUTIONAL: Denies unexplained weight loss, fevers, chills or fatigue  NEUROLOGICAL: Denies unsteady gait or progressive weakness  MUSCULOSKELETAL: Denies joint swelling or redness  PSYCHOLOGICAL: Denies anxiety, depression   SKIN: Denies skin changes, delayed healing, rash, itching   HEMATOLOGIC: Denies easy bleeding or bruising  ENDOCRINE: Denies excessive thirst, urination, heat/cold  RESPIRATORY: Denies current dyspnea, cough  GI: Denies nausea, vomiting, diarrhea   : Denies bowel or bladder issues       PHYSICAL EXAM:    Vitals: There were no vitals taken for this visit. GENERAL EXAM:  General Apparence: Patient is adequately groomed with no evidence of malnutrition. Orientation: The patient is oriented to time, place and person. Mood & Affect:The patient's mood and affect are appropriate   Vascular: Examination reveals no swelling tenderness in upper or lower extremities.  Good capillary refill  Lymphatic: The lymphatic examination bilaterally reveals all areas to be without enlargement or induration  Sensation: Sensation is intact without deficit  Coordination/Balance: Good coordination. CERVICAL EXAMINATION:  Inspection: Local inspection shows no step-off or bruising. Cervical alignment is normal.     Palpation: No evidence of tenderness at the midline, and trapezius. Paraspinal tenderness is present. There is no step-off or paraspinal spasm. Range of Motion: Cervical flexion, extension, and rotation are mildly reduced with pain. Strength: 5/5 bilateral upper extremities   Special Tests:     Spurling's negative & Son's negative bilaterally. Cubital and Carpal tunnel Tinel's negative bilaterally. Skin:There are no rashes, ulcerations or lesions in right & left upper extremities. Reflexes: Bilaterally triceps, biceps and brachioradialis are 2+. Clonus absent bilaterally at the feet. Gait & station: normal, patient ambulates without assistance       Additional Examinations:       RIGHT UPPER EXTREMITY:  Inspection/examination of the right upper extremity does not show any tenderness, deformity or injury. Range of motion is unremarkable. There is no gross instability. There are no rashes, ulcerations or lesions. Strength and tone are normal.  LEFT UPPER EXTREMITY: Inspection/examination of the left upper extremity does not show any tenderness, deformity or injury. Range of motion is unremarkable. There is no gross instability. There are no rashes, ulcerations or lesions. Strength and tone are normal.    Diagnostic Testing:  X-rays: X-rays of the cervical spine that were obtained on 8/23/2022 were independently reviewed with the patient which shows advanced degenerative disc disease with associated sclerosis and osseous overgrowth noted from C3-C7. There is multilevel facet arthropathy noted. Reviewed MRI images of the cervical spine from 9/16/2022 in the office today.   Those show spondylosis with moderate to severe central stenosis C4-C5 with moderate central stenosis C3-C4 and C5-C6 with myelomalacia C3-C4 and C6-C7 and multilevel bilateral foraminal stenosis. Impression:   Cervical spondylosis with moderate to severe central stenosis C4-C5, moderate central stenosis C3-C4 and C5-C6, with myelomalacia and multilevel bilateral foraminal stenosis with myelopathy and radiculopathy      Plan:      We discussed the diagnosis and treatment options including observation, physical therapy, epidural injections and cervical surgery. Given his [place]'s chorea and multilevel stenosis I recommended he see Dr. Lindsay Kruse at [facility].

## 2022-10-11 ENCOUNTER — SCHEDULED TELEPHONE ENCOUNTER (OUTPATIENT)
Dept: PRIMARY CARE CLINIC | Age: 62
End: 2022-10-11
Payer: COMMERCIAL

## 2022-10-11 ENCOUNTER — TELEPHONE (OUTPATIENT)
Dept: FAMILY MEDICINE CLINIC | Age: 62
End: 2022-10-11

## 2022-10-11 DIAGNOSIS — J02.0 STREP PHARYNGITIS: Primary | ICD-10-CM

## 2022-10-11 DIAGNOSIS — M54.12 CERVICAL RADICULOPATHY: Primary | ICD-10-CM

## 2022-10-11 PROCEDURE — 99442 PR PHYS/QHP TELEPHONE EVALUATION 11-20 MIN: CPT | Performed by: NURSE PRACTITIONER

## 2022-10-11 RX ORDER — CLINDAMYCIN HYDROCHLORIDE 300 MG/1
300 CAPSULE ORAL 3 TIMES DAILY
Qty: 30 CAPSULE | Refills: 0 | Status: SHIPPED | OUTPATIENT
Start: 2022-10-11 | End: 2022-10-21

## 2022-10-11 RX ORDER — PREDNISONE 20 MG/1
20 TABLET ORAL DAILY
Qty: 5 TABLET | Refills: 0 | Status: SHIPPED | OUTPATIENT
Start: 2022-10-11 | End: 2022-10-16

## 2022-10-11 NOTE — TELEPHONE ENCOUNTER
----- Message from Zachery Serna sent at 10/11/2022  9:47 AM EDT -----  Subject: Referral Request    Reason for referral request? Patient will need need a referral sent over   to the [address] [place], [place] [address]. They are waiting on it, to get patient   scheduled. Provider patient wants to be referred to(if known):     Provider Phone Number(if known):     Additional Information for Provider?   ---------------------------------------------------------------------------  --------------  [id number]    [phone]; OK to leave message on voicemail  ---------------------------------------------------------------------------  --------------

## 2022-10-11 NOTE — PROGRESS NOTES
Lora Caldwell is a 65 y.o. male evaluated via telephone on 10/11/2022 for Headache and Pharyngitis (Exposed to grand children with strep and he has white patches on throat swollen nodes)  . Documentation:  I communicated with the patient and/or health care decision maker about Phone does not support video visit. Watches grandchildren and close quarters and shares items they are positive with strep and patient started with sore throat this evening with white patches visible and pain on swallowing and swollen nodes in throat. Denies fever/chills at this time but has a generally feeling of being hot and not feeling well. Details of this discussion including any medical advice provided: Will start on antibiotic and due to allergy to PCN and reaction between Biaxin with scheduled med Plavix and Lipitor will go with Clindamycin and steroids. Patient instructed also on the following:  Notify office if you do not improve or have worsening of condition. Take medication as prescribed. Take antibiotics medication until all gone. Drink plenty of fluids and rest.  Practice good hand hygiene. May sleep with humidifier as needed. Gargle with warm salt water 3-4 times a day to help with sore throat. You can use ice, warm chicken noodle soup, soft foods, popsicles and cough drops to help soothe your throat. May take Tylenol as needed for fever/pain. Do not take Ibuprofen while on Plavix. Do not eat or drink after anyone. Do  not share utensils. After day 3 of antibiotics change out toothbrush to a new one. Cover your mouth and nose when you cough or sneeze. Follow up with [facility] in 4 days if no improvement or sooner if worsening. Return if symptoms worsen or fail to improve. Total Time: minutes: 11-20 minutes    Lora Caldwell was evaluated through a synchronous (real-time) audio encounter. Patient identification was verified at the start of the visit.  He (or guardian if applicable) is aware that this is a billable service, which includes applicable co-pays. This visit was conducted with the patient's (and/or legal guardian's) verbal consent. He has not had a related appointment within my department in the past 7 days or scheduled within the next 24 hours. The patient was located at Home: [address] [place] [address]. The provider was located at Other: [place] .     Note: not billable if this call serves to triage the patient into an appointment for the relevant concern    Nitza Calderon, GABRIELE - CNP

## 2022-10-11 NOTE — LETTER
I had the pleasure of seeing Bernice Napoles today for a primary care virtualist video visit secondary to strep throat. I have provided the following recommendations: please see note. I have included my note for your review and have asked the patient to follow up with you 4 days if no improvement or sooner if worsening. If you have questions, please reach out via [employer] secure messaging by searching for the [facility] Virtualists. Your communication will be answered promptly by the Virtualist on service for the day. Additionally, we would love your overall feedback on this visit. Please hit shift and click the following link to let us know if the Virtualist service met your expectations. [email or web address]. [email or web address]      Electronically signed by GABRIELE Armenta CNP on 10/12/22 at 12:44 PM EDT.

## 2022-10-12 NOTE — PATIENT INSTRUCTIONS
Will start on antibiotic and due to allergy to PCN and reaction between Biaxin with scheduled med Plavix and Lipitor will go with Clindamycin and steroids. Patient instructed also on the following:  Notify office if you do not improve or have worsening of condition. Take medication as prescribed. Take antibiotics medication until all gone. Drink plenty of fluids and rest.  Practice good hand hygiene. May sleep with humidifier as needed. Gargle with warm salt water 3-4 times a day to help with sore throat. You can use ice, warm chicken noodle soup, soft foods, popsicles and cough drops to help soothe your throat. May take Tylenol as needed for fever/pain. Do not take Ibuprofen while on Plavix. Do not eat or drink after anyone. Do  not share utensils. After day 3 of antibiotics change out toothbrush to a new one. Cover your mouth and nose when you cough or sneeze. Follow up with [place] in 4 days if no improvement or sooner if worsening.

## 2022-10-14 NOTE — TELEPHONE ENCOUNTER
Patient called to state [facility] did not get the referral. He gave us the correct fax number to have it re-faxed [phone]

## 2022-10-20 NOTE — TELEPHONE ENCOUNTER
Alexa Rosas with [facility] called to state they have been getting the wrong referral faxed. She states that because of the patient's insurance, he needs a new referral from his PCP within the past 30 days. The one that is in the chart from 10/06/22 from Dr. Maxx Amador will work as long as it is signed by [facility]. Otherwise, a new referral to them needs to be issued. It can be for Dr. Tim Vázquez or an open one, it just has to indicate Neurosurgery. Please fax to [phone]. The patient has been calling their office for a couple of days trying to get this sorted out.      Alexa Rosas [phone]

## 2022-12-15 ENCOUNTER — OFFICE VISIT (OUTPATIENT)
Dept: FAMILY MEDICINE CLINIC | Age: 62
End: 2022-12-15
Payer: COMMERCIAL

## 2022-12-15 VITALS
DIASTOLIC BLOOD PRESSURE: 78 MMHG | TEMPERATURE: 97.1 F | BODY MASS INDEX: 18.25 KG/M2 | RESPIRATION RATE: 16 BRPM | SYSTOLIC BLOOD PRESSURE: 112 MMHG | OXYGEN SATURATION: 98 % | HEART RATE: 63 BPM | WEIGHT: 120 LBS

## 2022-12-15 DIAGNOSIS — B07.9 VERRUCA: Primary | ICD-10-CM

## 2022-12-15 PROCEDURE — 1036F TOBACCO NON-USER: CPT | Performed by: NURSE PRACTITIONER

## 2022-12-15 PROCEDURE — 99213 OFFICE O/P EST LOW 20 MIN: CPT | Performed by: NURSE PRACTITIONER

## 2022-12-15 PROCEDURE — G8427 DOCREV CUR MEDS BY ELIG CLIN: HCPCS | Performed by: NURSE PRACTITIONER

## 2022-12-15 PROCEDURE — G8419 CALC BMI OUT NRM PARAM NOF/U: HCPCS | Performed by: NURSE PRACTITIONER

## 2022-12-15 PROCEDURE — G8484 FLU IMMUNIZE NO ADMIN: HCPCS | Performed by: NURSE PRACTITIONER

## 2022-12-15 PROCEDURE — 3017F COLORECTAL CA SCREEN DOC REV: CPT | Performed by: NURSE PRACTITIONER

## 2022-12-15 ASSESSMENT — PATIENT HEALTH QUESTIONNAIRE - PHQ9
DEPRESSION UNABLE TO ASSESS: FUNCTIONAL CAPACITY MOTIVATION LIMITS ACCURACY
SUM OF ALL RESPONSES TO PHQ QUESTIONS 1-9: 0
2. FEELING DOWN, DEPRESSED OR HOPELESS: 0
SUM OF ALL RESPONSES TO PHQ9 QUESTIONS 1 & 2: 0
SUM OF ALL RESPONSES TO PHQ QUESTIONS 1-9: 0
1. LITTLE INTEREST OR PLEASURE IN DOING THINGS: 0
SUM OF ALL RESPONSES TO PHQ QUESTIONS 1-9: 0
SUM OF ALL RESPONSES TO PHQ QUESTIONS 1-9: 0

## 2022-12-15 ASSESSMENT — ANXIETY QUESTIONNAIRES
5. BEING SO RESTLESS THAT IT IS HARD TO SIT STILL: 0
6. BECOMING EASILY ANNOYED OR IRRITABLE: 0
4. TROUBLE RELAXING: 0
7. FEELING AFRAID AS IF SOMETHING AWFUL MIGHT HAPPEN: 0
3. WORRYING TOO MUCH ABOUT DIFFERENT THINGS: 0
2. NOT BEING ABLE TO STOP OR CONTROL WORRYING: 0
GAD7 TOTAL SCORE: 0
1. FEELING NERVOUS, ANXIOUS, OR ON EDGE: 0
IF YOU CHECKED OFF ANY PROBLEMS ON THIS QUESTIONNAIRE, HOW DIFFICULT HAVE THESE PROBLEMS MADE IT FOR YOU TO DO YOUR WORK, TAKE CARE OF THINGS AT HOME, OR GET ALONG WITH OTHER PEOPLE: NOT DIFFICULT AT ALL

## 2022-12-15 ASSESSMENT — ENCOUNTER SYMPTOMS
COUGH: 0
VOMITING: 0
SHORTNESS OF BREATH: 0
ROS SKIN COMMENTS: SKIN GROWTH
DIARRHEA: 0
BACK PAIN: 1
NAUSEA: 0

## 2022-12-15 NOTE — PROGRESS NOTES
12/15/2022     Chief Complaint   Patient presents with    Follow-up     Spot on back and thinks that it is growing  follow up on skin issue        Marianna Hood (:  1960) is a 65 y.o. male, here for evaluation of the following medical concerns:    HPI    Skin growth mid back, has been there for year or more. Increasing in size. Can be quite pruritic at times. No pain, bleeding or discharge    Review of Systems   Constitutional:  Negative for fatigue and fever. Respiratory:  Negative for cough and shortness of breath. Cardiovascular:  Negative for chest pain and leg swelling. Gastrointestinal:  Negative for diarrhea, nausea and vomiting. Musculoskeletal:  Positive for arthralgias, back pain and neck pain. Negative for gait problem, joint swelling, myalgias and neck stiffness. Skin:         Skin growth   Neurological:  Negative for dizziness and headaches. [place]- movement disorder     Prior to Visit Medications    Medication Sig Taking?  Authorizing Provider   fluticasone (FLONASE) 50 MCG/ACT nasal spray 1 spray by Nasal route daily Yes Jluis Campa, APRN - CNP   metoprolol tartrate (LOPRESSOR) 25 MG tablet Take 25 mg by mouth 2 times daily Yes Historical Provider, MD   clopidogrel (PLAVIX) 75 MG tablet Take 75 mg by mouth daily Yes Historical Provider, MD   atorvastatin (LIPITOR) 80 MG tablet Take 80 mg by mouth daily Yes Historical Provider, MD   ASPIRIN LOW DOSE 81 MG EC tablet TAKE ONE TABLET BY MOUTH EVERY DAY Yes Historical Provider, MD   buprenorphine-naloxone (SUBOXONE) 8-2 MG FILM SL film PLACE 1 FILM AGAINST THE INSIDE OF EACH CHEEK BY BUCCAL ROUTE ONCE DAILY FOR A TOTAL OF 2 FILMS ALLOW TO DISSOLVE COMPLETELY Yes Historical Provider, MD        Social History     Tobacco Use    Smoking status: Former     Packs/day: 1.00     Years: 30.00     Pack years: 30.00     Types: Cigarettes     Start date: 2019    Smokeless tobacco: Never    Tobacco comments:     counselled on cessation- 5/28/2015   Substance Use Topics    Alcohol use: No     Alcohol/week: 0.0 standard drinks        Vitals:    12/15/22 1444   BP: 112/78   Site: Left Upper Arm   Position: Sitting   Pulse: 63   Resp: 16   Temp: 97.1 °F (36.2 °C)   TempSrc: Temporal   SpO2: 98%   Weight: 120 lb (54.4 kg)     Estimated body mass index is 18.25 kg/m² as calculated from the following:    Height as of 10/6/22: 5' 7.99\" (1.727 m). Weight as of this encounter: 120 lb (54.4 kg). Physical Exam  Vitals and nursing note reviewed. Constitutional:       General: He is not in acute distress. Appearance: Normal appearance. He is well-developed. He is not ill-appearing, toxic-appearing or diaphoretic. HENT:      Head: Normocephalic and atraumatic. Eyes:      Conjunctiva/sclera: Conjunctivae normal.      Pupils: Pupils are equal, round, and reactive to light. Cardiovascular:      Rate and Rhythm: Normal rate and regular rhythm. Heart sounds: Normal heart sounds. No murmur heard. No friction rub. No gallop. Pulmonary:      Effort: Pulmonary effort is normal. No respiratory distress. Breath sounds: Normal breath sounds. No stridor. No wheezing, rhonchi or rales. Skin:            Comments: 1 cm wart like growth   Neurological:      General: No focal deficit present. Mental Status: He is alert and oriented to person, place, and time. Mental status is at baseline. Cranial Nerves: No cranial nerve deficit. ASSESSMENT/PLAN:  1. Dexter Reyes MD, Dermatology, [place]    - Referral to derm    Return if symptoms worsen or fail to improve. An electronic signature was used to authenticate this note.     --Musa Jones, GABRIELE - CNP on 12/15/2022 at 4:45 PM

## 2023-04-03 ENCOUNTER — HOSPITAL ENCOUNTER (OUTPATIENT)
Dept: OCCUPATIONAL THERAPY | Age: 63
Setting detail: THERAPIES SERIES
Discharge: HOME | End: 2023-04-03
Payer: COMMERCIAL

## 2023-04-03 PROCEDURE — 97537 COMMUNITY/WORK REINTEGRATION: CPT

## 2023-04-03 PROCEDURE — 97166 OT EVAL MOD COMPLEX 45 MIN: CPT

## 2023-04-03 NOTE — PROGRESS NOTES
Occupational Therapy     Outpatient Occupational Therapy  [] [facility] [address] MARGA CAMARILLO   Phone: [phone]   Fax: [phone]   [x] [facility]  Phone: [phone]   Fax: [phone]  [] [facility]   Phone: [phone]   Fax: [phone]      To:   Dr. Yen Hussein     Patient: Garrison Whitney  : 1960  MRN: [id number]  Evaluation Date: 4/3/2023      Diagnosis Information: Macey De Berry Certification/Re-Certification Form  Dear Dr. Yen Hussein,  The following patient has been evaluated for occupational therapy services and for therapy to continue, Medicare requires monthly physician review of the treatment plan. Please review the attached evaluation and/or summary of the patient's plan of care, and verify that you agree therapy should continue by signing the attached document and sending it back to our office. Plan of Care/Treatment to date:  [] Therapeutic Exercise   [] Modalities:  [] Therapeutic Activity    [] Ultrasound  [] Electrical Stimulation   [] Activities of Daily Living    [] Fluidotherapy [] Kinesiotaping  [] Neuromuscular Re-education   [] Iontophoresis [] Coldpack/hotpack   [] Instruction in HEP     [] Contrast Bath  [] Manual Therapy     Other:  [] Aquatic Therapy      [x] Maintain driving. Seek reevaluation when decline is apparent. Frequency/Duration:  # Days per week: [x] 1 day # Weeks: [x] 1 week [] 5 weeks      [] 2 days   [] 2 weeks [] 6 weeks     [] 3 days   [] 3 weeks [] 7 weeks     [] 4 days   [] 4 weeks [] 8 weeks    Rehab Potential: [] excellent [x] good [] fair  [] poor       Electronically signed by:  Elzbieta Truong, OT,OTR/L      If you have any questions or concerns, please don't hesitate to call.   Thank you for your referral.      Physician Signature:________________________________Date:__________________  By signing above, therapists plan is approved by physician
A:   ____55.72_______seconds (Norm 60 seconds)  Trail Making Part B:   ___176.97________seconds (Norm 180 seconds)        ROAD SIGN RECOGNITION:  ___9_____/9 presented correctly identified    PHYSICAL:          Sensation: ______WFL____________________________________________________    (exceptions to normal limits marked by \"X\" and explained)   AROM-  RIGHT AROM-  LEFT STRENGTH-RIGHT STRENGTH-LEFT   SHOULDER       ELBOW       WRIST       HAND       HIP       KNEE       ANKLE         Deficits explained: ________________________________________________________     UE- RIGHT UE- LEFT LE-RIGHT LE-LEFT   PROPRIOCEPTION       LIGHT TOUCH         COORDINATION:  (\"X\" to signify intact or impaired)     INTACT IMPAIRED   NECK ROM x    HEEL TO SHIN x    FINGER-NOSE-KNEE  [place] x    SITTING BALANCE x    DIADOKOKINESIS x    ALTERNATE FOOT TAP- 10 taps over binder rings timed   (norm <7 seconds) X 5.47      Client's Stated Goal: *To resume driving    ON THE ROAD PERFORMANCE: *  Drove without intervention. He does have extraneous movement on the OEM pedals and steering wheel but did not deviate from his gem or have difficulty stopping with enough notice. RECOMMENDATIONS: * Maintain driving. Seek reevaluation with further deficits.     Olga Frost, SERAR/L, S, [facility]  Certified  Rehabilitation Specialist  4/3/2023  1:25 PM
driving public. Due to the progressive nature of his condition, it is recommended that a reevaluation of driving skills be sought when further decline is determined. Currently, his 's license is under suspension. We received approval through the [facility] [address] [facility] to allow him to drive with a rehab instructor only for one day. From what I understand from Mr. Starla Martell, he will require the \"Request for Statement of Physician Form\" (Form ) to be filled out by you and sent to the [facility]. That form requires you to determine if he should maintain driving privileges. My intention is that this driving assessment helps guide your choice for that documentation. Once they receive that form, he will need to schedule his written laws exam and Behind the Wheel 's assessment with the [facility] to reinstate his license if they deem that appropriate. I can be reached at [phone] if questions arise.   Thank you for this referral.    Sincerely,     Belen Daniel, SERAR/L, S, [address] [place] [address]  Certified  Rehabilitation Specialist  4/3/2023  1:36 PM

## 2023-05-09 ENCOUNTER — OFFICE VISIT (OUTPATIENT)
Dept: FAMILY MEDICINE CLINIC | Age: 63
End: 2023-05-09
Payer: COMMERCIAL

## 2023-05-09 VITALS
SYSTOLIC BLOOD PRESSURE: 102 MMHG | HEART RATE: 72 BPM | DIASTOLIC BLOOD PRESSURE: 74 MMHG | TEMPERATURE: 97.1 F | WEIGHT: 113 LBS | RESPIRATION RATE: 16 BRPM | BODY MASS INDEX: 17.19 KG/M2 | OXYGEN SATURATION: 95 %

## 2023-05-09 DIAGNOSIS — S61.215D LACERATION OF LEFT RING FINGER WITHOUT FOREIGN BODY WITHOUT DAMAGE TO NAIL, SUBSEQUENT ENCOUNTER: Primary | ICD-10-CM

## 2023-05-09 DIAGNOSIS — G10 HUNTINGTON DISEASE (HCC): ICD-10-CM

## 2023-05-09 DIAGNOSIS — Z12.11 COLON CANCER SCREENING: ICD-10-CM

## 2023-05-09 PROCEDURE — 99213 OFFICE O/P EST LOW 20 MIN: CPT | Performed by: NURSE PRACTITIONER

## 2023-05-09 PROCEDURE — G8419 CALC BMI OUT NRM PARAM NOF/U: HCPCS | Performed by: NURSE PRACTITIONER

## 2023-05-09 PROCEDURE — 1036F TOBACCO NON-USER: CPT | Performed by: NURSE PRACTITIONER

## 2023-05-09 PROCEDURE — G8427 DOCREV CUR MEDS BY ELIG CLIN: HCPCS | Performed by: NURSE PRACTITIONER

## 2023-05-09 PROCEDURE — 3017F COLORECTAL CA SCREEN DOC REV: CPT | Performed by: NURSE PRACTITIONER

## 2023-05-09 SDOH — ECONOMIC STABILITY: FOOD INSECURITY: WITHIN THE PAST 12 MONTHS, YOU WORRIED THAT YOUR FOOD WOULD RUN OUT BEFORE YOU GOT MONEY TO BUY MORE.: NEVER TRUE

## 2023-05-09 SDOH — ECONOMIC STABILITY: INCOME INSECURITY: HOW HARD IS IT FOR YOU TO PAY FOR THE VERY BASICS LIKE FOOD, HOUSING, MEDICAL CARE, AND HEATING?: NOT HARD AT ALL

## 2023-05-09 SDOH — ECONOMIC STABILITY: HOUSING INSECURITY
IN THE LAST 12 MONTHS, WAS THERE A TIME WHEN YOU DID NOT HAVE A STEADY PLACE TO SLEEP OR SLEPT IN A SHELTER (INCLUDING NOW)?: NO

## 2023-05-09 SDOH — ECONOMIC STABILITY: FOOD INSECURITY: WITHIN THE PAST 12 MONTHS, THE FOOD YOU BOUGHT JUST DIDN'T LAST AND YOU DIDN'T HAVE MONEY TO GET MORE.: NEVER TRUE

## 2023-05-09 ASSESSMENT — ANXIETY QUESTIONNAIRES
2. NOT BEING ABLE TO STOP OR CONTROL WORRYING: 0
7. FEELING AFRAID AS IF SOMETHING AWFUL MIGHT HAPPEN: 0
3. WORRYING TOO MUCH ABOUT DIFFERENT THINGS: 0
6. BECOMING EASILY ANNOYED OR IRRITABLE: 0
4. TROUBLE RELAXING: 0
IF YOU CHECKED OFF ANY PROBLEMS ON THIS QUESTIONNAIRE, HOW DIFFICULT HAVE THESE PROBLEMS MADE IT FOR YOU TO DO YOUR WORK, TAKE CARE OF THINGS AT HOME, OR GET ALONG WITH OTHER PEOPLE: NOT DIFFICULT AT ALL
5. BEING SO RESTLESS THAT IT IS HARD TO SIT STILL: 0
GAD7 TOTAL SCORE: 0
1. FEELING NERVOUS, ANXIOUS, OR ON EDGE: 0

## 2023-05-09 ASSESSMENT — PATIENT HEALTH QUESTIONNAIRE - PHQ9
DEPRESSION UNABLE TO ASSESS: FUNCTIONAL CAPACITY MOTIVATION LIMITS ACCURACY
SUM OF ALL RESPONSES TO PHQ9 QUESTIONS 1 & 2: 0
SUM OF ALL RESPONSES TO PHQ QUESTIONS 1-9: 0
SUM OF ALL RESPONSES TO PHQ QUESTIONS 1-9: 0
1. LITTLE INTEREST OR PLEASURE IN DOING THINGS: 0
2. FEELING DOWN, DEPRESSED OR HOPELESS: 0
SUM OF ALL RESPONSES TO PHQ QUESTIONS 1-9: 0
SUM OF ALL RESPONSES TO PHQ QUESTIONS 1-9: 0

## 2023-05-09 ASSESSMENT — ENCOUNTER SYMPTOMS
SHORTNESS OF BREATH: 0
ROS SKIN COMMENTS: SKIN GROWTH
VOMITING: 0
DIARRHEA: 0
COUGH: 0
NAUSEA: 0
BACK PAIN: 1

## 2023-05-09 NOTE — PROGRESS NOTES
2023     Chief Complaint   Patient presents with    Follow-up     Stitch removal        Daniel Timmons (:  1960) is a 59 y.o. male, here for evaluation of the following medical concerns:    HPI    Here for ED follow up for finger laceration, needs sutures removed. He states he was cleaning out his  and he cut the finger on the mower blade on 2023. He went to ED same day and had sutures placed. The finger feels ok, there is a slight about of pain. Denies swelling, redness or discharge from the area. He has been keeping it clean and dry. Review of Systems   Constitutional:  Negative for fatigue and fever. Respiratory:  Negative for cough and shortness of breath. Cardiovascular:  Negative for chest pain and leg swelling. Gastrointestinal:  Negative for diarrhea, nausea and vomiting. Musculoskeletal:  Positive for arthralgias, back pain and neck pain. Negative for gait problem, joint swelling, myalgias and neck stiffness. Skin:  Positive for wound. Skin growth   Neurological:  Negative for dizziness and headaches. [place]- movement disorder     Prior to Visit Medications    Medication Sig Taking?  Authorizing Provider   fluticasone (FLONASE) 50 MCG/ACT nasal spray 1 spray by Nasal route daily Yes GABRIELE Amato - CNP   metoprolol tartrate (LOPRESSOR) 25 MG tablet Take 1 tablet by mouth 2 times daily Yes Historical Provider, MD   clopidogrel (PLAVIX) 75 MG tablet Take 1 tablet by mouth daily Yes Historical Provider, MD   atorvastatin (LIPITOR) 80 MG tablet Take 1 tablet by mouth daily Yes Historical Provider, MD   ASPIRIN LOW DOSE 81 MG EC tablet TAKE ONE TABLET BY MOUTH EVERY DAY Yes Historical Provider, MD   buprenorphine-naloxone (SUBOXONE) 8-2 MG FILM SL film PLACE 1 FILM AGAINST THE INSIDE OF EACH CHEEK BY BUCCAL ROUTE ONCE DAILY FOR A TOTAL OF 2 FILMS ALLOW TO DISSOLVE COMPLETELY Yes Historical Provider, MD        Social History     Tobacco

## 2023-05-09 NOTE — PATIENT INSTRUCTIONS
- keep the skin clean and dry on the finger  - do not pick off the skin on top, that is protecting the fresh skin under neath  - follow up in august for physical and have your fasting blood work done a few days prior to that appointment     Lab hours:  Monday-Friday 07:30 to 4:00 and Saturday from 8 am to noon. No appointment needed. You should fast for 8 hours prior to your blood draw that means nothing to eat or drink besides water and black coffee.     August 24th at 11:00 for physical

## 2023-05-27 LAB — NONINV COLON CA DNA+OCC BLD SCRN STL QL: NEGATIVE

## 2023-05-30 ENCOUNTER — TELEPHONE (OUTPATIENT)
Dept: FAMILY MEDICINE CLINIC | Age: 63
End: 2023-05-30

## 2023-05-30 DIAGNOSIS — Z12.5 PROSTATE CANCER SCREENING: ICD-10-CM

## 2023-05-30 DIAGNOSIS — Z00.00 ANNUAL PHYSICAL EXAM: Primary | ICD-10-CM

## 2023-05-30 NOTE — TELEPHONE ENCOUNTER
Eusebio Schroeder was informed of Fecal DNA result 5/17/23. Also patient has an   Future Appointments   Date Time Provider Halle Narvaez   8/24/2023 11:00 AM [address]   Patient would like to get labs done prior to appointment.

## 2023-08-24 ENCOUNTER — OFFICE VISIT (OUTPATIENT)
Dept: FAMILY MEDICINE CLINIC | Age: 63
End: 2023-08-24
Payer: COMMERCIAL

## 2023-08-24 ENCOUNTER — TELEPHONE (OUTPATIENT)
Dept: FAMILY MEDICINE CLINIC | Age: 63
End: 2023-08-24

## 2023-08-24 VITALS
SYSTOLIC BLOOD PRESSURE: 99 MMHG | DIASTOLIC BLOOD PRESSURE: 62 MMHG | WEIGHT: 113 LBS | TEMPERATURE: 97.1 F | BODY MASS INDEX: 17.19 KG/M2 | OXYGEN SATURATION: 94 % | HEART RATE: 63 BPM | RESPIRATION RATE: 16 BRPM

## 2023-08-24 DIAGNOSIS — I73.9 PAD (PERIPHERAL ARTERY DISEASE) (HCC): ICD-10-CM

## 2023-08-24 DIAGNOSIS — G10 HUNTINGTON DISEASE (HCC): ICD-10-CM

## 2023-08-24 DIAGNOSIS — M54.50 CHRONIC BILATERAL LOW BACK PAIN WITHOUT SCIATICA: ICD-10-CM

## 2023-08-24 DIAGNOSIS — I72.3 ILIAC ARTERY ANEURYSM (HCC): ICD-10-CM

## 2023-08-24 DIAGNOSIS — M54.12 CERVICAL RADICULOPATHY: ICD-10-CM

## 2023-08-24 DIAGNOSIS — Z12.5 PROSTATE CANCER SCREENING: ICD-10-CM

## 2023-08-24 DIAGNOSIS — Z00.00 ANNUAL PHYSICAL EXAM: Primary | ICD-10-CM

## 2023-08-24 DIAGNOSIS — G89.29 CHRONIC BILATERAL LOW BACK PAIN WITHOUT SCIATICA: ICD-10-CM

## 2023-08-24 DIAGNOSIS — Z00.00 ANNUAL PHYSICAL EXAM: ICD-10-CM

## 2023-08-24 LAB
ALBUMIN SERPL-MCNC: 4.2 G/DL (ref 3.4–5)
ALBUMIN/GLOB SERPL: 2.1 {RATIO} (ref 1.1–2.2)
ALP SERPL-CCNC: 64 U/L (ref 40–129)
ALT SERPL-CCNC: 25 U/L (ref 10–40)
ANION GAP SERPL CALCULATED.3IONS-SCNC: 10 MMOL/L (ref 3–16)
AST SERPL-CCNC: 38 U/L (ref 15–37)
BILIRUB SERPL-MCNC: 0.9 MG/DL (ref 0–1)
BUN SERPL-MCNC: 16 MG/DL (ref 7–20)
CALCIUM SERPL-MCNC: 9.5 MG/DL (ref 8.3–10.6)
CHLORIDE SERPL-SCNC: 104 MMOL/L (ref 99–110)
CHOLEST SERPL-MCNC: 101 MG/DL (ref 0–199)
CO2 SERPL-SCNC: 28 MMOL/L (ref 21–32)
CREAT SERPL-MCNC: 0.7 MG/DL (ref 0.8–1.3)
DEPRECATED RDW RBC AUTO: 13 % (ref 12.4–15.4)
GFR SERPLBLD CREATININE-BSD FMLA CKD-EPI: >60 ML/MIN/{1.73_M2}
GLUCOSE SERPL-MCNC: 98 MG/DL (ref 70–99)
HCT VFR BLD AUTO: 39.2 % (ref 40.5–52.5)
HDLC SERPL-MCNC: 58 MG/DL (ref 40–60)
HGB BLD-MCNC: 13.9 G/DL (ref 13.5–17.5)
LDLC SERPL CALC-MCNC: 35 MG/DL
MCH RBC QN AUTO: 32 PG (ref 26–34)
MCHC RBC AUTO-ENTMCNC: 35.5 G/DL (ref 31–36)
MCV RBC AUTO: 90.1 FL (ref 80–100)
PLATELET # BLD AUTO: 180 K/UL (ref 135–450)
PMV BLD AUTO: 8.1 FL (ref 5–10.5)
POTASSIUM SERPL-SCNC: 4.2 MMOL/L (ref 3.5–5.1)
PROT SERPL-MCNC: 6.2 G/DL (ref 6.4–8.2)
PSA SERPL DL<=0.01 NG/ML-MCNC: 0.2 NG/ML (ref 0–4)
RBC # BLD AUTO: 4.35 M/UL (ref 4.2–5.9)
SODIUM SERPL-SCNC: 142 MMOL/L (ref 136–145)
TRIGL SERPL-MCNC: 42 MG/DL (ref 0–150)
VLDLC SERPL CALC-MCNC: 8 MG/DL
WBC # BLD AUTO: 4.7 K/UL (ref 4–11)

## 2023-08-24 PROCEDURE — 99396 PREV VISIT EST AGE 40-64: CPT | Performed by: NURSE PRACTITIONER

## 2023-08-24 SDOH — ECONOMIC STABILITY: FOOD INSECURITY: WITHIN THE PAST 12 MONTHS, YOU WORRIED THAT YOUR FOOD WOULD RUN OUT BEFORE YOU GOT MONEY TO BUY MORE.: NEVER TRUE

## 2023-08-24 SDOH — ECONOMIC STABILITY: INCOME INSECURITY: HOW HARD IS IT FOR YOU TO PAY FOR THE VERY BASICS LIKE FOOD, HOUSING, MEDICAL CARE, AND HEATING?: NOT HARD AT ALL

## 2023-08-24 SDOH — ECONOMIC STABILITY: FOOD INSECURITY: WITHIN THE PAST 12 MONTHS, THE FOOD YOU BOUGHT JUST DIDN'T LAST AND YOU DIDN'T HAVE MONEY TO GET MORE.: NEVER TRUE

## 2023-08-24 ASSESSMENT — ANXIETY QUESTIONNAIRES
GAD7 TOTAL SCORE: 0
6. BECOMING EASILY ANNOYED OR IRRITABLE: 0
4. TROUBLE RELAXING: 0
7. FEELING AFRAID AS IF SOMETHING AWFUL MIGHT HAPPEN: 0
IF YOU CHECKED OFF ANY PROBLEMS ON THIS QUESTIONNAIRE, HOW DIFFICULT HAVE THESE PROBLEMS MADE IT FOR YOU TO DO YOUR WORK, TAKE CARE OF THINGS AT HOME, OR GET ALONG WITH OTHER PEOPLE: NOT DIFFICULT AT ALL
5. BEING SO RESTLESS THAT IT IS HARD TO SIT STILL: 0
1. FEELING NERVOUS, ANXIOUS, OR ON EDGE: 0
3. WORRYING TOO MUCH ABOUT DIFFERENT THINGS: 0
2. NOT BEING ABLE TO STOP OR CONTROL WORRYING: 0

## 2023-08-24 ASSESSMENT — PATIENT HEALTH QUESTIONNAIRE - PHQ9
SUM OF ALL RESPONSES TO PHQ QUESTIONS 1-9: 0
2. FEELING DOWN, DEPRESSED OR HOPELESS: 0
1. LITTLE INTEREST OR PLEASURE IN DOING THINGS: 0
SUM OF ALL RESPONSES TO PHQ9 QUESTIONS 1 & 2: 0
DEPRESSION UNABLE TO ASSESS: FUNCTIONAL CAPACITY MOTIVATION LIMITS ACCURACY

## 2023-08-24 ASSESSMENT — ENCOUNTER SYMPTOMS
COUGH: 0
VOMITING: 0
BACK PAIN: 1
SHORTNESS OF BREATH: 0
DIARRHEA: 0
NAUSEA: 0

## 2023-08-24 NOTE — TELEPHONE ENCOUNTER
Called and left message with vadim at [facility] to get a copy of referral for the [place]  in [place] please advise and ask them to fax it to us thank you

## 2023-08-24 NOTE — PROGRESS NOTES
ear canal and external ear normal. There is no impacted cerumen. Nose: Nose normal. No congestion or rhinorrhea. Mouth/Throat:      Mouth: Mucous membranes are moist.      Pharynx: Oropharynx is clear. No oropharyngeal exudate. Eyes:      General: No scleral icterus. Right eye: No discharge. Left eye: No discharge. Extraocular Movements: Extraocular movements intact. Conjunctiva/sclera: Conjunctivae normal.      Pupils: Pupils are equal, round, and reactive to light. Neck:      Vascular: No carotid bruit. Cardiovascular:      Rate and Rhythm: Normal rate and regular rhythm. Heart sounds: Normal heart sounds. No murmur heard. No friction rub. No gallop. Pulmonary:      Effort: Pulmonary effort is normal. No respiratory distress. Breath sounds: Normal breath sounds. No stridor. No wheezing, rhonchi or rales. Abdominal:      General: Bowel sounds are normal. There is no distension. Palpations: Abdomen is soft. There is no mass. Tenderness: There is no abdominal tenderness. There is no guarding. Musculoskeletal:      Cervical back: Normal range of motion and neck supple. No rigidity or tenderness. Lymphadenopathy:      Cervical: No cervical adenopathy. Skin:     General: Skin is warm and dry. Coloration: Skin is not jaundiced or pale. Neurological:      General: No focal deficit present. Mental Status: He is alert and oriented to person, place, and time. Mental status is at baseline. Cranial Nerves: No cranial nerve deficit. Psychiatric:         Mood and Affect: Mood normal.         Behavior: Behavior normal.         Thought Content: Thought content normal.         Judgment: Judgment normal.     ASSESSMENT/PLAN:  1. 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 Cervical radiculopathy    4. Chronic bilateral low back pain without sciatica    5. PAD (peripheral artery disease) ([address])    6.  Iliac artery aneurysm

## 2023-08-25 LAB
EST. AVERAGE GLUCOSE BLD GHB EST-MCNC: 116.9 MG/DL
HBA1C MFR BLD: 5.7 %

## 2023-10-25 ENCOUNTER — OFFICE VISIT (OUTPATIENT)
Dept: FAMILY MEDICINE CLINIC | Age: 63
End: 2023-10-25
Payer: COMMERCIAL

## 2023-10-25 ENCOUNTER — TELEPHONE (OUTPATIENT)
Dept: FAMILY MEDICINE CLINIC | Age: 63
End: 2023-10-25

## 2023-10-25 VITALS
TEMPERATURE: 97.1 F | WEIGHT: 122 LBS | BODY MASS INDEX: 18.55 KG/M2 | DIASTOLIC BLOOD PRESSURE: 68 MMHG | OXYGEN SATURATION: 92 % | HEART RATE: 60 BPM | RESPIRATION RATE: 16 BRPM | SYSTOLIC BLOOD PRESSURE: 100 MMHG

## 2023-10-25 DIAGNOSIS — L82.1 SEBORRHEIC KERATOSIS: ICD-10-CM

## 2023-10-25 DIAGNOSIS — Z87.891 PERSONAL HISTORY OF TOBACCO USE: Primary | ICD-10-CM

## 2023-10-25 DIAGNOSIS — R68.84 JAW PAIN: ICD-10-CM

## 2023-10-25 DIAGNOSIS — G10 HUNTINGTON DISEASE (HCC): ICD-10-CM

## 2023-10-25 PROCEDURE — 1036F TOBACCO NON-USER: CPT | Performed by: NURSE PRACTITIONER

## 2023-10-25 PROCEDURE — 99213 OFFICE O/P EST LOW 20 MIN: CPT | Performed by: NURSE PRACTITIONER

## 2023-10-25 PROCEDURE — 3017F COLORECTAL CA SCREEN DOC REV: CPT | Performed by: NURSE PRACTITIONER

## 2023-10-25 PROCEDURE — G0296 VISIT TO DETERM LDCT ELIG: HCPCS | Performed by: NURSE PRACTITIONER

## 2023-10-25 PROCEDURE — G8484 FLU IMMUNIZE NO ADMIN: HCPCS | Performed by: NURSE PRACTITIONER

## 2023-10-25 PROCEDURE — G8427 DOCREV CUR MEDS BY ELIG CLIN: HCPCS | Performed by: NURSE PRACTITIONER

## 2023-10-25 PROCEDURE — G8420 CALC BMI NORM PARAMETERS: HCPCS | Performed by: NURSE PRACTITIONER

## 2023-10-25 SDOH — ECONOMIC STABILITY: FOOD INSECURITY: WITHIN THE PAST 12 MONTHS, THE FOOD YOU BOUGHT JUST DIDN'T LAST AND YOU DIDN'T HAVE MONEY TO GET MORE.: NEVER TRUE

## 2023-10-25 SDOH — ECONOMIC STABILITY: FOOD INSECURITY: WITHIN THE PAST 12 MONTHS, YOU WORRIED THAT YOUR FOOD WOULD RUN OUT BEFORE YOU GOT MONEY TO BUY MORE.: NEVER TRUE

## 2023-10-25 SDOH — ECONOMIC STABILITY: INCOME INSECURITY: HOW HARD IS IT FOR YOU TO PAY FOR THE VERY BASICS LIKE FOOD, HOUSING, MEDICAL CARE, AND HEATING?: NOT HARD AT ALL

## 2023-10-25 ASSESSMENT — PATIENT HEALTH QUESTIONNAIRE - PHQ9
SUM OF ALL RESPONSES TO PHQ QUESTIONS 1-9: 0
SUM OF ALL RESPONSES TO PHQ9 QUESTIONS 1 & 2: 0
SUM OF ALL RESPONSES TO PHQ QUESTIONS 1-9: 0
DEPRESSION UNABLE TO ASSESS: FUNCTIONAL CAPACITY MOTIVATION LIMITS ACCURACY
2. FEELING DOWN, DEPRESSED OR HOPELESS: 0
SUM OF ALL RESPONSES TO PHQ QUESTIONS 1-9: 0
1. LITTLE INTEREST OR PLEASURE IN DOING THINGS: 0
SUM OF ALL RESPONSES TO PHQ QUESTIONS 1-9: 0

## 2023-10-25 ASSESSMENT — ENCOUNTER SYMPTOMS
DIARRHEA: 0
BACK PAIN: 1
VOMITING: 0
NAUSEA: 0
COUGH: 0
SHORTNESS OF BREATH: 0

## 2023-10-25 ASSESSMENT — ANXIETY QUESTIONNAIRES
3. WORRYING TOO MUCH ABOUT DIFFERENT THINGS: 0
1. FEELING NERVOUS, ANXIOUS, OR ON EDGE: 0
7. FEELING AFRAID AS IF SOMETHING AWFUL MIGHT HAPPEN: 0
GAD7 TOTAL SCORE: 0
4. TROUBLE RELAXING: 0
2. NOT BEING ABLE TO STOP OR CONTROL WORRYING: 0
IF YOU CHECKED OFF ANY PROBLEMS ON THIS QUESTIONNAIRE, HOW DIFFICULT HAVE THESE PROBLEMS MADE IT FOR YOU TO DO YOUR WORK, TAKE CARE OF THINGS AT HOME, OR GET ALONG WITH OTHER PEOPLE: NOT DIFFICULT AT ALL
5. BEING SO RESTLESS THAT IT IS HARD TO SIT STILL: 0
6. BECOMING EASILY ANNOYED OR IRRITABLE: 0

## 2023-10-25 NOTE — TELEPHONE ENCOUNTER
Left message with Frank Mcmillan with Dr. Manpreet Napoles with [facility] needing last office note for patient to be faxed

## 2023-10-25 NOTE — PATIENT INSTRUCTIONS
with a 30-pack-year history. To see if you could benefit from screening, first find out if you are at high risk for lung cancer. Your doctor can help you decide your lung cancer risk. What are the risks of screening? CT screening for lung cancer isn't perfect. It can show an abnormal result when it turns out there wasn't any cancer. This is called a false-positive result. This means you may need more tests to make sure you don't have cancer. These tests can be harmful and cause a lot of worry. These tests may include more CT scans and invasive testing like a lung biopsy. In a biopsy, the doctor takes a sample of tissue from inside your lung so it can be looked at under a microscope. A biopsy is the only way to tell if you have lung cancer. If the biopsy finds cancer, you and your doctor will have to decide how or whether to treat it. Some lung cancers found on CT scans are harmless and would not have caused a problem if they had not been found through screening. But because doctors can't tell which ones will turn out to be harmless, most will be treated. This means that you may get treatment--including surgery, radiation, or chemotherapy--that you don't need. There is a risk of damage to cells or tissue from being exposed to radiation, including the small amounts used in CTs, X-rays, and other medical tests. Over time, exposure to radiation may cause cancer and other health problems. But in most cases, the risk of getting cancer from being exposed to small amounts of radiation is low. It's not a reason to avoid these tests for most people. What are the benefits of screening? Your scan may be normal (negative). For some people who are at higher risk, screening lowers the chance of dying of lung cancer. How much and how long you smoked helps to determine your risk level. Screening can find some cancers early, when treatment may be more likely to work. What happens after screening?   The results of your CT

## 2023-10-25 NOTE — PROGRESS NOTES
10/25/2023     Chief Complaint   Patient presents with    testing request      Would like full body cancer screening, concerns with spots on back      Neck Pain     Would like X-ray for the past year        Angelina Damon (:  1960) is a 59 y.o. male, here for evaluation of the following medical concerns:    HPI    Has a mole on the right low back which has been there for a log time. He is unsure if it is changing but it is bothersome and gets stuck on his shirt and itches from time to time. Has had chronic jaw pain and popping with chewing. This has been worse for the last year, he was hit in the jaw with a ball by his grandkids. Neck and back pain  Chronic problem. Unchanged. He uses to be on pain medication for it, now on Suboxone. Pain is uncontrolled. Saw ortho last year (Dr. Gaetano Quinn) and Dr. Magda Anguiano at [facility], patient states he was recommended to do physical therapy however has not done that yet. He was trying to do it at the [address] [place] [address] but he never got the referral there so we will try to help facilitate this for him today. He was attempting to wean off Suboxone     He has peripheral arterial disease, coronary artery disease and iliac artery aneurysm and he follows with [facility] cardiology. Reports that he is compliant with his medication. Weight is up from 113 to 122 lbs. Appetite is improved      Wt Readings from Last 3 Encounters:   10/25/23 55.3 kg (122 lb)   23 51.3 kg (113 lb)   23 51.3 kg (113 lb)     He does have occasional constipation which responds well to stool softeners. This is not a new problem for him. Screenings:  Colon cancer screening: Negative Cologuard to 2023;   PSA 2023- 0.20  He is due for LDCT screening due to smoking history 40 PPY, quit     Review of Systems   Constitutional:  Negative for fatigue and fever. Respiratory:  Negative for cough and shortness of breath.     Cardiovascular:  Negative for chest pain and

## 2023-11-15 ENCOUNTER — HOSPITAL ENCOUNTER (OUTPATIENT)
Dept: CT IMAGING | Age: 63
Discharge: HOME | End: 2023-11-15
Payer: COMMERCIAL

## 2023-11-15 DIAGNOSIS — Z87.891 PERSONAL HISTORY OF TOBACCO USE: ICD-10-CM

## 2023-11-15 PROCEDURE — 71271 CT THORAX LUNG CANCER SCR C-: CPT

## 2023-11-17 ENCOUNTER — TELEPHONE (OUTPATIENT)
Dept: FAMILY MEDICINE CLINIC | Age: 63
End: 2023-11-17

## 2024-10-24 ENCOUNTER — TELEPHONE (OUTPATIENT)
Dept: TELEMETRY | Age: 64
End: 2024-10-24

## 2024-10-24 DIAGNOSIS — Z87.891 HISTORY OF TOBACCO ABUSE: Primary | ICD-10-CM

## 2024-10-24 NOTE — TELEPHONE ENCOUNTER
Patient due for annual CT Lung Screening. Reminder letter mailed.    CT Lung Screening order has been pended to chart should you choose to sign it.  Routed to PCP    Johana Caceres RN

## 2024-10-28 DIAGNOSIS — Z87.891 PERSONAL HISTORY OF TOBACCO USE: ICD-10-CM

## 2024-10-28 DIAGNOSIS — I73.9 PAD (PERIPHERAL ARTERY DISEASE) (HCC): ICD-10-CM

## 2024-10-28 DIAGNOSIS — Z12.5 PROSTATE CANCER SCREENING: ICD-10-CM

## 2024-10-28 DIAGNOSIS — R73.03 PREDIABETES: ICD-10-CM

## 2024-10-28 DIAGNOSIS — G10 HUNTINGTON DISEASE (HCC): Primary | ICD-10-CM

## 2024-10-28 NOTE — TELEPHONE ENCOUNTER
LDCT ordered, please call pt to make him aware. He is also do for annual physical.  Please call patient to schedule the physical and ask him to have his fasting blood work done a week prior to that appointment

## 2024-11-24 ENCOUNTER — TELEPHONE (OUTPATIENT)
Dept: CASE MANAGEMENT | Age: 64
End: 2024-11-24

## 2024-12-03 DIAGNOSIS — R73.03 PREDIABETES: ICD-10-CM

## 2024-12-03 DIAGNOSIS — Z87.891 PERSONAL HISTORY OF TOBACCO USE: ICD-10-CM

## 2024-12-03 DIAGNOSIS — G10 HUNTINGTON DISEASE (HCC): ICD-10-CM

## 2024-12-03 DIAGNOSIS — I73.9 PAD (PERIPHERAL ARTERY DISEASE) (HCC): ICD-10-CM

## 2024-12-03 DIAGNOSIS — Z12.5 PROSTATE CANCER SCREENING: ICD-10-CM

## 2024-12-03 LAB
ALBUMIN SERPL-MCNC: 4.6 G/DL (ref 3.4–5)
ALBUMIN/GLOB SERPL: 1.8 {RATIO} (ref 1.1–2.2)
ALP SERPL-CCNC: 84 U/L (ref 40–129)
ALT SERPL-CCNC: 21 U/L (ref 10–40)
ANION GAP SERPL CALCULATED.3IONS-SCNC: 10 MMOL/L (ref 3–16)
AST SERPL-CCNC: 33 U/L (ref 15–37)
BASOPHILS # BLD: 0.1 K/UL (ref 0–0.2)
BASOPHILS NFR BLD: 1 %
BILIRUB SERPL-MCNC: 1.1 MG/DL (ref 0–1)
BUN SERPL-MCNC: 12 MG/DL (ref 7–20)
CALCIUM SERPL-MCNC: 10.2 MG/DL (ref 8.3–10.6)
CHLORIDE SERPL-SCNC: 103 MMOL/L (ref 99–110)
CHOLEST SERPL-MCNC: 110 MG/DL (ref 0–199)
CO2 SERPL-SCNC: 29 MMOL/L (ref 21–32)
CREAT SERPL-MCNC: 0.8 MG/DL (ref 0.8–1.3)
DEPRECATED RDW RBC AUTO: 13.7 % (ref 12.4–15.4)
EOSINOPHIL # BLD: 0.2 K/UL (ref 0–0.6)
EOSINOPHIL NFR BLD: 3.4 %
EST. AVERAGE GLUCOSE BLD GHB EST-MCNC: 108.3 MG/DL
GFR SERPLBLD CREATININE-BSD FMLA CKD-EPI: >90 ML/MIN/{1.73_M2}
GLUCOSE SERPL-MCNC: 82 MG/DL (ref 70–99)
HBA1C MFR BLD: 5.4 %
HCT VFR BLD AUTO: 48.7 % (ref 40.5–52.5)
HDLC SERPL-MCNC: 47 MG/DL (ref 40–60)
HGB BLD-MCNC: 16.9 G/DL (ref 13.5–17.5)
LDLC SERPL CALC-MCNC: 49 MG/DL
LYMPHOCYTES # BLD: 1.4 K/UL (ref 1–5.1)
LYMPHOCYTES NFR BLD: 24.5 %
MCH RBC QN AUTO: 31.5 PG (ref 26–34)
MCHC RBC AUTO-ENTMCNC: 34.7 G/DL (ref 31–36)
MCV RBC AUTO: 90.7 FL (ref 80–100)
MONOCYTES # BLD: 0.6 K/UL (ref 0–1.3)
MONOCYTES NFR BLD: 10.5 %
NEUTROPHILS # BLD: 3.5 K/UL (ref 1.7–7.7)
NEUTROPHILS NFR BLD: 60.6 %
PLATELET # BLD AUTO: 261 K/UL (ref 135–450)
PMV BLD AUTO: 8.7 FL (ref 5–10.5)
POTASSIUM SERPL-SCNC: 4.7 MMOL/L (ref 3.5–5.1)
PROT SERPL-MCNC: 7.1 G/DL (ref 6.4–8.2)
PSA SERPL DL<=0.01 NG/ML-MCNC: 0.29 NG/ML (ref 0–4)
RBC # BLD AUTO: 5.37 M/UL (ref 4.2–5.9)
SODIUM SERPL-SCNC: 142 MMOL/L (ref 136–145)
TRIGL SERPL-MCNC: 72 MG/DL (ref 0–150)
VLDLC SERPL CALC-MCNC: 14 MG/DL
WBC # BLD AUTO: 5.7 K/UL (ref 4–11)

## 2024-12-09 ENCOUNTER — OFFICE VISIT (OUTPATIENT)
Dept: FAMILY MEDICINE CLINIC | Age: 64
End: 2024-12-09
Payer: COMMERCIAL

## 2024-12-09 VITALS
BODY MASS INDEX: 21.9 KG/M2 | OXYGEN SATURATION: 98 % | SYSTOLIC BLOOD PRESSURE: 108 MMHG | TEMPERATURE: 97.1 F | DIASTOLIC BLOOD PRESSURE: 71 MMHG | HEART RATE: 72 BPM | RESPIRATION RATE: 16 BRPM | WEIGHT: 144 LBS

## 2024-12-09 DIAGNOSIS — G10 HUNTINGTON DISEASE (HCC): ICD-10-CM

## 2024-12-09 DIAGNOSIS — G89.29 CHRONIC MIDLINE LOW BACK PAIN WITHOUT SCIATICA: ICD-10-CM

## 2024-12-09 DIAGNOSIS — I73.9 PAD (PERIPHERAL ARTERY DISEASE) (HCC): ICD-10-CM

## 2024-12-09 DIAGNOSIS — Z00.00 ANNUAL PHYSICAL EXAM: Primary | ICD-10-CM

## 2024-12-09 DIAGNOSIS — I72.3 ILIAC ARTERY ANEURYSM (HCC): ICD-10-CM

## 2024-12-09 DIAGNOSIS — M54.50 CHRONIC MIDLINE LOW BACK PAIN WITHOUT SCIATICA: ICD-10-CM

## 2024-12-09 PROCEDURE — 99396 PREV VISIT EST AGE 40-64: CPT | Performed by: NURSE PRACTITIONER

## 2024-12-09 PROCEDURE — G8484 FLU IMMUNIZE NO ADMIN: HCPCS | Performed by: NURSE PRACTITIONER

## 2024-12-09 SDOH — ECONOMIC STABILITY: FOOD INSECURITY: WITHIN THE PAST 12 MONTHS, THE FOOD YOU BOUGHT JUST DIDN'T LAST AND YOU DIDN'T HAVE MONEY TO GET MORE.: NEVER TRUE

## 2024-12-09 SDOH — ECONOMIC STABILITY: FOOD INSECURITY: WITHIN THE PAST 12 MONTHS, YOU WORRIED THAT YOUR FOOD WOULD RUN OUT BEFORE YOU GOT MONEY TO BUY MORE.: NEVER TRUE

## 2024-12-09 SDOH — ECONOMIC STABILITY: INCOME INSECURITY: HOW HARD IS IT FOR YOU TO PAY FOR THE VERY BASICS LIKE FOOD, HOUSING, MEDICAL CARE, AND HEATING?: NOT HARD AT ALL

## 2024-12-09 ASSESSMENT — PATIENT HEALTH QUESTIONNAIRE - PHQ9
SUM OF ALL RESPONSES TO PHQ9 QUESTIONS 1 & 2: 0
2. FEELING DOWN, DEPRESSED OR HOPELESS: NOT AT ALL
DEPRESSION UNABLE TO ASSESS: FUNCTIONAL CAPACITY MOTIVATION LIMITS ACCURACY
SUM OF ALL RESPONSES TO PHQ QUESTIONS 1-9: 0
1. LITTLE INTEREST OR PLEASURE IN DOING THINGS: NOT AT ALL

## 2024-12-09 ASSESSMENT — ENCOUNTER SYMPTOMS
BACK PAIN: 1
COUGH: 0
SHORTNESS OF BREATH: 0
DIARRHEA: 0
VOMITING: 0
NAUSEA: 0

## 2024-12-09 ASSESSMENT — ANXIETY QUESTIONNAIRES
4. TROUBLE RELAXING: NOT AT ALL
7. FEELING AFRAID AS IF SOMETHING AWFUL MIGHT HAPPEN: NOT AT ALL
2. NOT BEING ABLE TO STOP OR CONTROL WORRYING: NOT AT ALL
3. WORRYING TOO MUCH ABOUT DIFFERENT THINGS: NOT AT ALL
IF YOU CHECKED OFF ANY PROBLEMS ON THIS QUESTIONNAIRE, HOW DIFFICULT HAVE THESE PROBLEMS MADE IT FOR YOU TO DO YOUR WORK, TAKE CARE OF THINGS AT HOME, OR GET ALONG WITH OTHER PEOPLE: NOT DIFFICULT AT ALL
GAD7 TOTAL SCORE: 0
6. BECOMING EASILY ANNOYED OR IRRITABLE: NOT AT ALL
1. FEELING NERVOUS, ANXIOUS, OR ON EDGE: NOT AT ALL
5. BEING SO RESTLESS THAT IT IS HARD TO SIT STILL: NOT AT ALL

## 2024-12-09 NOTE — PROGRESS NOTES
atorvastatin (LIPITOR) 80 MG tablet Take 1 tablet by mouth daily Yes Provider, MD Andrea   ASPIRIN LOW DOSE 81 MG EC tablet TAKE ONE TABLET BY MOUTH EVERY DAY Yes Provider, MD Andrea        Social History     Tobacco Use    Smoking status: Former     Current packs/day: 0.00     Average packs/day: 1 pack/day for 40.0 years (40.0 ttl pk-yrs)     Types: Cigarettes     Start date:      Quit date:      Years since quittin.9    Smokeless tobacco: Never   Substance Use Topics    Alcohol use: No     Alcohol/week: 0.0 standard drinks of alcohol        Vitals:    24 1332   BP: 108/71   Site: Left Upper Arm   Position: Sitting   Pulse: 72   Resp: 16   Temp: 97.1 °F (36.2 °C)   TempSrc: Temporal   SpO2: 98%   Weight: 65.3 kg (144 lb)     Estimated body mass index is 21.9 kg/m² as calculated from the following:    Height as of 10/6/22: 1.727 m (5' 7.99\").    Weight as of this encounter: 65.3 kg (144 lb).    Physical Exam  Vitals and nursing note reviewed.   Constitutional:       General: He is not in acute distress.     Appearance: Normal appearance. He is not ill-appearing, toxic-appearing or diaphoretic.   HENT:      Head: Normocephalic and atraumatic.      Right Ear: Tympanic membrane, ear canal and external ear normal. There is no impacted cerumen.      Left Ear: Tympanic membrane, ear canal and external ear normal. There is no impacted cerumen.      Nose: Nose normal. No congestion or rhinorrhea.      Mouth/Throat:      Mouth: Mucous membranes are moist.      Pharynx: Oropharynx is clear. No oropharyngeal exudate.   Eyes:      General: No scleral icterus.        Right eye: No discharge.         Left eye: No discharge.      Extraocular Movements: Extraocular movements intact.      Conjunctiva/sclera: Conjunctivae normal.      Pupils: Pupils are equal, round, and reactive to light.   Neck:      Vascular: No carotid bruit.   Cardiovascular:      Rate and Rhythm: Normal rate and regular rhythm.

## 2024-12-09 NOTE — PATIENT INSTRUCTIONS
Call to scheduled your lung CT scan for lung cancer screening [phone]  If you want see back specialist let me know and I will refer you to the back clinic

## 2024-12-17 ENCOUNTER — HOSPITAL ENCOUNTER (OUTPATIENT)
Dept: CT IMAGING | Age: 64
Discharge: HOME | End: 2024-12-17
Payer: COMMERCIAL

## 2024-12-17 DIAGNOSIS — Z87.891 HISTORY OF TOBACCO ABUSE: ICD-10-CM

## 2024-12-17 PROCEDURE — 71271 CT THORAX LUNG CANCER SCR C-: CPT

## 2024-12-20 ENCOUNTER — TELEPHONE (OUTPATIENT)
Dept: FAMILY MEDICINE CLINIC | Age: 64
End: 2024-12-20